# Patient Record
Sex: FEMALE | Employment: PART TIME | URBAN - METROPOLITAN AREA
[De-identification: names, ages, dates, MRNs, and addresses within clinical notes are randomized per-mention and may not be internally consistent; named-entity substitution may affect disease eponyms.]

---

## 2017-10-27 ENCOUNTER — OFFICE VISIT (OUTPATIENT)
Dept: FAMILY MEDICINE | Facility: CLINIC | Age: 37
End: 2017-10-27
Payer: COMMERCIAL

## 2017-10-27 VITALS
HEIGHT: 56 IN | DIASTOLIC BLOOD PRESSURE: 75 MMHG | SYSTOLIC BLOOD PRESSURE: 114 MMHG | BODY MASS INDEX: 16.83 KG/M2 | OXYGEN SATURATION: 97 % | HEART RATE: 97 BPM | WEIGHT: 74.8 LBS | TEMPERATURE: 97.9 F

## 2017-10-27 DIAGNOSIS — K21.9 GASTROESOPHAGEAL REFLUX DISEASE, ESOPHAGITIS PRESENCE NOT SPECIFIED: ICD-10-CM

## 2017-10-27 DIAGNOSIS — Z00.8 ENCOUNTER FOR BIOMETRIC SCREENING: Primary | ICD-10-CM

## 2017-10-27 LAB
CHOLEST SERPL-MCNC: 115 MG/DL
GLUCOSE SERPL-MCNC: 91 MG/DL (ref 70–99)
HDLC SERPL-MCNC: 63 MG/DL
LDLC SERPL CALC-MCNC: 41 MG/DL
NONHDLC SERPL-MCNC: 52 MG/DL
TRIGL SERPL-MCNC: 53 MG/DL

## 2017-10-27 PROCEDURE — 82947 ASSAY GLUCOSE BLOOD QUANT: CPT | Performed by: INTERNAL MEDICINE

## 2017-10-27 PROCEDURE — 36415 COLL VENOUS BLD VENIPUNCTURE: CPT | Performed by: INTERNAL MEDICINE

## 2017-10-27 PROCEDURE — 99213 OFFICE O/P EST LOW 20 MIN: CPT | Performed by: INTERNAL MEDICINE

## 2017-10-27 PROCEDURE — 80061 LIPID PANEL: CPT | Performed by: INTERNAL MEDICINE

## 2017-10-27 NOTE — NURSING NOTE
"Chief Complaint   Patient presents with     Forms     [facility] forms        Initial /75 (BP Location: Right arm, Patient Position: Chair, Cuff Size: Adult Small)  Pulse 97  Temp 97.9  F (36.6  C) (Tympanic)  Ht 4' 7.5\" (1.41 m)  Wt 74 lb 12.8 oz (33.9 kg)  SpO2 97%  BMI 17.07 kg/m2 Estimated body mass index is 17.07 kg/(m^2) as calculated from the following:    Height as of this encounter: 4' 7.5\" (1.41 m).    Weight as of this encounter: 74 lb 12.8 oz (33.9 kg).  Medication Reconciliation: complete   Kylee CUTLER CMA (AAMA)    "

## 2017-10-27 NOTE — PATIENT INSTRUCTIONS
You can use Tums or Rolaids as needed for reflux, a daily medication is probably not necessary at this time.

## 2017-10-27 NOTE — MR AVS SNAPSHOT
"              After Visit Summary   10/27/2017    Rosita Corrigan    MRN: [id number]           Patient Information     Date Of Birth          1980        Visit Information        Provider Department      10/27/2017 4:00 PM Francis Tate MD [facility]        Today's Diagnoses     Encounter for biometric screening    -  1      Care Instructions    You can use Tums or Rolaids as needed for reflux, a daily medication is probably not necessary at this time.            Follow-ups after your visit        Who to contact     If you have questions or need follow up information about today's clinic visit or your schedule please contact [facility] directly at [phone].  Normal or non-critical lab and imaging results will be communicated to you by MyChart, letter or phone within 4 business days after the clinic has received the results. If you do not hear from us within 7 days, please contact the clinic through [employer] or phone. If you have a critical or abnormal lab result, we will notify you by phone as soon as possible.  Submit refill requests through [employer] or call your pharmacy and they will forward the refill request to us. Please allow 3 business days for your refill to be completed.          Additional Information About Your Visit        MyChart Information     [employer] gives you secure access to your electronic health record. If you see a primary care provider, you can also send messages to your care team and make appointments. If you have questions, please call your primary care clinic.  If you do not have a primary care provider, please call [phone] and they will assist you.        Care EveryWhere ID     This is your Care EveryWhere ID. This could be used by other organizations to access your [employer] medical records  [id number]        Your Vitals Were     Pulse Temperature Height Pulse Oximetry BMI (Body Mass Index)       97 97.9  F (36.6  C) (Tympanic) 4' 7.5\" (1.41 m) " 97% 17.07 kg/m2        Blood Pressure from Last 3 Encounters:   10/27/17 114/75   09/13/16 100/66   07/23/15 115/77    Weight from Last 3 Encounters:   10/27/17 74 lb 12.8 oz (33.9 kg)   09/13/16 74 lb (33.6 kg)   07/23/15 69 lb 9.6 oz (31.6 kg)              We Performed the Following     Glucose     Lipid panel reflex to direct LDL Non-fasting        Primary Care Provider Office Phone # Fax #    Ellen Canela [phone]       [facility] [address] [facility] [address]        Equal Access to Services     OLGA GARCIA : Hadii aj kimo Soghislaine, waaxda philadaha, qaybta kaalmada ademc, pascual fried. So [facility] [phone].    ATENCIÓN: Si habla español, tiene a bradford disposición servicios gratuitos de asistencia lingüística. Llame al [phone].    We comply with applicable federal civil rights laws and [place] laws. We do not discriminate on the basis of race, color, national origin, age, disability, sex, sexual orientation, or gender identity.            Thank you!     Thank you for choosing [facility]  for your care. Our goal is always to provide you with excellent care. Hearing back from our patients is one way we can continue to improve our services. Please take a few minutes to complete the written survey that you may receive in the mail after your visit with us. Thank you!             Your Updated Medication List - Protect others around you: Learn how to safely use, store and throw away your medicines at www.disposemymeds.org.          This list is accurate as of: 10/27/17  4:15 PM.  Always use your most recent med list.                   Brand Name Dispense Instructions for use Diagnosis    probiotic Caps     90 capsule    Take 1 tablet by mouth daily

## 2017-10-27 NOTE — PROGRESS NOTES
"  SUBJECTIVE:   Rosita Corrigan is a 37 year old female who presents to clinic today for the following health issues:  Chief Complaint   Patient presents with     Forms     [facility] forms      Rosita presents for Biometric screening.    She also wonders if she should be taking something for reflux.  Gets this 1-2 times per week and currently takes no meds.  Had side effects of dizziness with both PPIs and H2 blockers.      Problem list and histories reviewed & adjusted, as indicated.  Additional history: as documented    Patient Active Problem List   Diagnosis     Irritable bowel syndrome     Anxiety     Past Surgical History:   Procedure Laterality Date     MOUTH SURGERY      wisdom teeth       Social History   Substance Use Topics     Smoking status: Never Smoker     Smokeless tobacco: Never Used     Alcohol use Yes      Comment: occassional     Family History   Problem Relation Age of Onset     Adopted: Yes     Unknown/Adopted Mother          Current Outpatient Prescriptions   Medication Sig Dispense Refill     probiotic CAPS Take 1 tablet by mouth daily 90 capsule 0     Allergies   Allergen Reactions     Omeprazole Other (See Comments) and Difficulty breathing     dizzy         Reviewed and updated as needed this visit by clinical staffTobacco  Allergies  Med Hx  Surg Hx  Fam Hx  Soc Hx      Reviewed and updated as needed this visit by Provider         ROS:  Constitutional and gi systems are negative, except as otherwise noted.      OBJECTIVE:   /75 (BP Location: Right arm, Patient Position: Chair, Cuff Size: Adult Small)  Pulse 97  Temp 97.9  F (36.6  C) (Tympanic)  Ht 4' 7.5\" (1.41 m)  Wt 74 lb 12.8 oz (33.9 kg)  SpO2 97%  BMI 17.07 kg/m2  Body mass index is 17.07 kg/(m^2).  GENERAL: healthy, alert and no distress  RESP: lungs clear to auscultation - no rales, rhonchi or wheezes  CV: regular rate and rhythm, normal S1 S2, no S3 or S4, no murmur, click or rub    ASSESSMENT/PLAN: "       1. Encounter for biometric screening    She would like to draw non-fasting labs now.  Consider repeating fasting if they are elevated.      - Glucose  - Lipid panel reflex to direct LDL Non-fasting    2. Gastroesophageal reflux disease, esophagitis presence not specified    She's had dizziness with both PPIs and H2 blockers.  She wonders if she should be treated this- we discussed long term risk of esophageal dysplasia from long standing reflux, but she is only have very intermittent symptoms, so she is likely not at high risk of this.  Okay to try prn Tums or Rolaids.        Francis Tate MD  [facility]

## 2017-10-30 ENCOUNTER — TELEPHONE (OUTPATIENT)
Dept: FAMILY MEDICINE | Facility: CLINIC | Age: 37
End: 2017-10-30

## 2017-10-30 NOTE — TELEPHONE ENCOUNTER
[facility] screening form completed and signed at appointment and faxed to [phone]

## 2017-11-29 ENCOUNTER — TELEPHONE (OUTPATIENT)
Dept: FAMILY MEDICINE | Facility: CLINIC | Age: 37
End: 2017-11-29

## 2017-11-29 NOTE — TELEPHONE ENCOUNTER
I coded the visit as a 80647, which I think would still be the appropriate charge if it was for biometric screening only.  Maria Esther- is this correct?  Was there an extra charge added elsewhere for the reflux?    My note indicates that she asked about the reflux.  I'm not sure why I would have brought that up on my own since it was not listed on her problem list.      Francis Tate MD

## 2017-11-29 NOTE — TELEPHONE ENCOUNTER
Reason for Call:  Other     Detailed comments: Patient spoke to the billing office about her appointment on 10/27 with Dr. Tate. Patient was charged for Biometric screen and Gastro Reflux Disease. Patient does not think she should be charged for the Gastro reflux disease as she did not come in for that reason. Dr. Tate asked her a question about it that the patient did not want to talk about. The billing office states the only way to get this charge removed is if Dr. Tate changes this description of the visit.    Phone Number Patient can be reached at: Home number on file [phone] (home)    Best Time: any    Can we leave a detailed message on this number? YES    Call taken on 11/29/2017 at 8:30 AM by Nena Mesa

## 2017-11-29 NOTE — TELEPHONE ENCOUNTER
Discussed with Maria Esther and a coding review is in process- they should contact her with further info.  Thanks.

## 2017-12-24 ENCOUNTER — HOSPITAL ENCOUNTER (EMERGENCY)
Facility: CLINIC | Age: 37
Discharge: HOME | End: 2017-12-24
Attending: EMERGENCY MEDICINE | Admitting: EMERGENCY MEDICINE
Payer: COMMERCIAL

## 2017-12-24 VITALS
HEART RATE: 117 BPM | TEMPERATURE: 97.2 F | BODY MASS INDEX: 17.35 KG/M2 | DIASTOLIC BLOOD PRESSURE: 85 MMHG | OXYGEN SATURATION: 100 % | SYSTOLIC BLOOD PRESSURE: 126 MMHG | WEIGHT: 76 LBS | RESPIRATION RATE: 18 BRPM

## 2017-12-24 DIAGNOSIS — J01.00 ACUTE NON-RECURRENT MAXILLARY SINUSITIS: ICD-10-CM

## 2017-12-24 DIAGNOSIS — R05.9 COUGH: ICD-10-CM

## 2017-12-24 DIAGNOSIS — J20.9 ACUTE BRONCHITIS, UNSPECIFIED ORGANISM: ICD-10-CM

## 2017-12-24 PROCEDURE — 99282 EMERGENCY DEPT VISIT SF MDM: CPT | Performed by: EMERGENCY MEDICINE

## 2017-12-24 PROCEDURE — 99284 EMERGENCY DEPT VISIT MOD MDM: CPT | Mod: Z6 | Performed by: EMERGENCY MEDICINE

## 2017-12-24 RX ORDER — AZITHROMYCIN 250 MG/1
TABLET, FILM COATED ORAL
Qty: 6 TABLET | Refills: 0 | Status: SHIPPED | OUTPATIENT
Start: 2017-12-24 | End: 2017-12-29

## 2017-12-24 ASSESSMENT — ENCOUNTER SYMPTOMS
DIZZINESS: 1
SHORTNESS OF BREATH: 0
COUGH: 1
WHEEZING: 0
APPETITE CHANGE: 1

## 2017-12-24 NOTE — ED NOTES
Pt here with cough that she has had for about a week. , s/t and chills, has had a few days, has been around kids that have had pos strep and coughs. She is a;/o x e. Cough is productive greenish thick phlegm. Has been taking in fluids well but decreased food intake.

## 2017-12-24 NOTE — DISCHARGE INSTRUCTIONS
Antibiotics as prescribed.          Bronchitis, Antibiotic Treatment (Adult)    Bronchitis is an infection of the air passages (bronchial tubes) in your lungs. It often occurs when you have a cold. This illness is contagious during the first few days and is spread through the air by coughing and sneezing, or by direct contact (touching the sick person and then touching your own eyes, nose, or mouth).  Symptoms of bronchitis include cough with mucus (phlegm) and low-grade fever. Bronchitis usually lasts 7 to 14 days. Mild cases can be treated with simple home remedies. More severe infection is treated with an antibiotic.  Home care  Follow these guidelines when caring for yourself at home:    If your symptoms are severe, rest at home for the first 2 to 3 days. When you go back to your usual activities, don't let yourself get too tired.    Do not smoke. Also avoid being exposed to secondhand smoke.    You may use over-the-counter medicines to control fever or pain, unless another medicine was prescribed. (Note: If you have chronic liver or kidney disease or have ever had a stomach ulcer or gastrointestinal bleeding, talk with your healthcare provider before using these medicines. Also talk to your provider if you are taking medicine to prevent blood clots.) Aspirin should never be given to anyone younger than 18 years of age who is ill with a viral infection or fever. It may cause severe liver or brain damage.    Your appetite may be poor, so a light diet is fine. Avoid dehydration by drinking 6 to 8 glasses of fluids per day (such as water, soft drinks, sports drinks, juices, tea, or soup). Extra fluids will help loosen secretions in the nose and lungs.    Over-the-counter cough, cold, and sore-throat medicines will not shorten the length of the illness, but they may be helpful to reduce symptoms. (Note: Do not use decongestants if you have high blood pressure.)    Finish all antibiotic medicine. Do this even if you  are feeling better after only a few days.  Follow-up care  Follow up with your healthcare provider, or as advised. If you had an X-ray or ECG (electrocardiogram), a specialist will review it. You will be notified of any new findings that may affect your care.  Note: If you are age 65 or older, or if you have a chronic lung disease or condition that affects your immune system, or you smoke, talk to your healthcare provider about having pneumococcal vaccinations and a yearly influenza vaccination (flu shot).  When to seek medical advice  Call your healthcare provider right away if any of these occur:    Fever of 100.4 F (38 C) or higher    Coughing up increased amounts of colored sputum    Weakness, drowsiness, headache, facial pain, ear pain, or a stiff neck  Call 911, or get immediate medical care  Contact emergency services right away if any of these occur.    Coughing up blood    Worsening weakness, drowsiness, headache, or stiff neck    Trouble breathing, wheezing, or pain with breathing  Date Last Reviewed: 9/13/2015 2000-2017 [employer]. [address] [place] [address]. All rights reserved. This information is not intended as a substitute for professional medical care. Always follow your healthcare professional's instructions.

## 2017-12-24 NOTE — ED AVS SNAPSHOT
[facility] Emergency Department    [address] [address]    Phone:  [phone]    Fax:  [phone]                                       Rosita Corrigan   MRN: [id number]    Department:  [facility] Emergency Department   Date of Visit:  12/24/2017           Patient Information     Date Of Birth          1980        Your diagnoses for this visit were:     Cough     Acute bronchitis, unspecified organism     Acute non-recurrent maxillary sinusitis        You were seen by Alphonso Harrington MD.        Discharge Instructions       Antibiotics as prescribed.          Bronchitis, Antibiotic Treatment (Adult)    Bronchitis is an infection of the air passages (bronchial tubes) in your lungs. It often occurs when you have a cold. This illness is contagious during the first few days and is spread through the air by coughing and sneezing, or by direct contact (touching the sick person and then touching your own eyes, nose, or mouth).  Symptoms of bronchitis include cough with mucus (phlegm) and low-grade fever. Bronchitis usually lasts 7 to 14 days. Mild cases can be treated with simple home remedies. More severe infection is treated with an antibiotic.  Home care  Follow these guidelines when caring for yourself at home:    If your symptoms are severe, rest at home for the first 2 to 3 days. When you go back to your usual activities, don't let yourself get too tired.    Do not smoke. Also avoid being exposed to secondhand smoke.    You may use over-the-counter medicines to control fever or pain, unless another medicine was prescribed. (Note: If you have chronic liver or kidney disease or have ever had a stomach ulcer or gastrointestinal bleeding, talk with your healthcare provider before using these medicines. Also talk to your provider if you are taking medicine to prevent blood clots.) Aspirin should never be given to anyone younger than 18 years of age who is ill with a viral  infection or fever. It may cause severe liver or brain damage.    Your appetite may be poor, so a light diet is fine. Avoid dehydration by drinking 6 to 8 glasses of fluids per day (such as water, soft drinks, sports drinks, juices, tea, or soup). Extra fluids will help loosen secretions in the nose and lungs.    Over-the-counter cough, cold, and sore-throat medicines will not shorten the length of the illness, but they may be helpful to reduce symptoms. (Note: Do not use decongestants if you have high blood pressure.)    Finish all antibiotic medicine. Do this even if you are feeling better after only a few days.  Follow-up care  Follow up with your healthcare provider, or as advised. If you had an X-ray or ECG (electrocardiogram), a specialist will review it. You will be notified of any new findings that may affect your care.  Note: If you are age 65 or older, or if you have a chronic lung disease or condition that affects your immune system, or you smoke, talk to your healthcare provider about having pneumococcal vaccinations and a yearly influenza vaccination (flu shot).  When to seek medical advice  Call your healthcare provider right away if any of these occur:    Fever of 100.4 F (38 C) or higher    Coughing up increased amounts of colored sputum    Weakness, drowsiness, headache, facial pain, ear pain, or a stiff neck  Call 911, or get immediate medical care  Contact emergency services right away if any of these occur.    Coughing up blood    Worsening weakness, drowsiness, headache, or stiff neck    Trouble breathing, wheezing, or pain with breathing  Date Last Reviewed: 9/13/2015 2000-2017 [employer]. [address] [place] [address], [place] [address]. All rights reserved. This information is not intended as a substitute for professional medical care. Always follow your healthcare professional's instructions.          24 Hour Appointment Hotline       To make an appointment at any [facility],  call [phone] ([phone]). If you don't have a family doctor or clinic, we will help you find one. [facility] clinics are conveniently located to serve the needs of you and your family.             Review of your medicines      START taking        Dose / Directions Last dose taken    azithromycin 250 MG tablet   Commonly known as:  ZITHROMAX Z-RUBEN   Quantity:  6 tablet        Two tablets on the first day, then one tablet daily for the next 4 days   Refills:  0          Our records show that you are taking the medicines listed below. If these are incorrect, please call your family doctor or clinic.        Dose / Directions Last dose taken    probiotic Caps   Dose:  1 tablet   Quantity:  90 capsule        Take 1 tablet by mouth daily   Refills:  0                Prescriptions were sent or printed at these locations (1 Prescription)                   [facility] [address] [address]    Telephone:  [phone]   Fax:  [phone]   Hours:                  E-Prescribed (1 of 1)         azithromycin (ZITHROMAX Z-RUBEN) 250 MG tablet                Orders Needing Specimen Collection     None      Pending Results     No orders found from 12/22/2017 to 12/25/2017.            Pending Culture Results     No orders found from 12/22/2017 to 12/25/2017.            Pending Results Instructions     If you had any lab results that were not finalized at the time of your Discharge, you can call the ED Lab Result RN at [phone]. You will be contacted by this team for any positive Lab results or changes in treatment. The nurses are available 7 days a week from 10A to 6:30P.  You can leave a message 24 hours per day and they will return your call.        Test Results From Your Hospital Stay               Thank you for choosing [facility]       Thank you for choosing [facility] for your care. Our goal is always to provide you with excellent care. Hearing back from  our patients is one way we can continue to improve our services. Please take a few minutes to complete the written survey that you may receive in the mail after you visit with us. Thank you!        [employer] Information     [employer] gives you secure access to your electronic health record. If you see a primary care provider, you can also send messages to your care team and make appointments. If you have questions, please call your primary care clinic.  If you do not have a primary care provider, please call [phone] and they will assist you.        Care EveryWhere ID     This is your Care EveryWhere ID. This could be used by other organizations to access your [facility] medical records  [id number]        Equal Access to Services     OLGA GARCIA : Lynette Gillis, rebekah gustafson, herrera daniels, pascual fried. So [facility] [phone].    ATENCIÓN: Si habla español, tiene a bradford disposición servicios gratuitos de asistencia lingüística. Llame al [phone].    We comply with applicable federal civil rights laws and [place] laws. We do not discriminate on the basis of race, color, national origin, age, disability, sex, sexual orientation, or gender identity.            After Visit Summary       This is your record. Keep this with you and show to your community pharmacist(s) and doctor(s) at your next visit.

## 2017-12-24 NOTE — ED AVS SNAPSHOT
[facility] Emergency Department    [address] [facility] [address]    Phone:  [phone]    Fax:  [phone]                                       Rosita Corrigan   MRN: [id number]    Department:  [facility] Emergency Department   Date of Visit:  12/24/2017           After Visit Summary Signature Page     I have received my discharge instructions, and my questions have been answered. I have discussed any challenges I see with this plan with the nurse or doctor.    ..........................................................................................................................................  Patient/Patient Representative Signature      ..........................................................................................................................................  Patient Representative Print Name and Relationship to Patient    ..................................................               ................................................  Date                                            Time    ..........................................................................................................................................  Reviewed by Signature/Title    ...................................................              ..............................................  Date                                                            Time

## 2017-12-24 NOTE — ED PROVIDER NOTES
History     Chief Complaint   Patient presents with     Cough     HPI  Rosita Corrigan is a 37 year old female who presents to the ED with her  for the evaluation of a cough x1 week. She reports a productive cough, with green, brown, and red sputum. Patient states her symptoms began with a headache, and then she developed a cough. She suspected a sinus infection and took Sudafed all day yesterday, which helped relieve her headache but did not alleviate her cough. Patient presents today with a concern for pneumonia. She complains of dizziness, but attributes this to her not eating very much lately. Patient does not know if she has a fever. 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 Patient has never used an inhaler before. Denies SOB, wheezing, lower extremity edema, any recent travel.    Problem List:    Patient Active Problem List    Diagnosis Date Noted     Anxiety 2015     Priority: Medium     Irritable bowel syndrome 2015     Priority: Medium        Past Medical History:    Past Medical History:   Diagnosis Date     Gestational diabetes       contractions        Past Surgical History:    Past Surgical History:   Procedure Laterality Date     MOUTH SURGERY      wisdom teeth       Family History:    Family History   Problem Relation Age of Onset     Adopted: Yes     Unknown/Adopted Mother        Social History:  Marital Status:   [2]  Social History   Substance Use Topics     Smoking status: Never Smoker     Smokeless tobacco: Never Used     Alcohol use Yes      Comment: occassional        Medications:      azithromycin (ZITHROMAX Z-RUBEN) 250 MG tablet   probiotic CAPS         Review of Systems   Constitutional: Positive for appetite change (loss).   Respiratory: Positive for cough (productive). Negative for shortness of breath and wheezing.    Cardiovascular: Negative for leg swelling.   Neurological: Positive for dizziness.   All other systems reviewed and  are negative.      Physical Exam   BP: 126/85  Pulse: 117  Temp: 97.2  F (36.2  C)  Resp: 18  Weight: 34.5 kg (76 lb)  SpO2: 100 %      Physical Exam  /85  Pulse 117  Temp 97.2  F (36.2  C)  Resp 18  Wt 34.5 kg (76 lb)  SpO2 100%  BMI 17.35 kg/m2  /85  Pulse 117  Temp 97.2  F (36.2  C)  Resp 18  Wt 34.5 kg (76 lb)  SpO2 100%  BMI 17.35 kg/m2  General: alert and in no acute distress  Head: atraumatic, normocephalic    Abd: Soft, nontender, nondistended, no peritoneal signs ears are normal bilaterally  Lungs: Clear to auscultation bilaterally, with no wheezes, rhonchi, or rales  Musculoskel/Extremities: normal extremities, no edema, erythema, tenderness and full AROM of major joints without tenderness  Skin: no rashes, no diaphoresis and skin color normal  Neuro: Patient awake, alert, oriented, speech is fluent, gait is normal  Psychiatric: affect/mood normal, cooperative, normal judgement/insight and memory intact          ED Course     ED Course     Procedures               Critical Care time:  none               Labs Ordered and Resulted from Time of ED Arrival Up to the Time of Departure from the ED - No data to display  No results found for this or any previous visit (from the past 24 hour(s)).    Medications - No data to display    12:05 PM Patient assessed.      Assessments & Plan (with Medical Decision Making)  37 year old female, presenting to the emergency department with one-week history of productive cough, with greenish sputum.  No fevers, however has had slight chills.  Does have multiple ill contacts.  No daily medication use.  Patient arrives afebrile, with normal vitals.  Symptoms have started out with sinus type infection symptoms, with maxillary pain, and since developed into productive cough.  Given history of bronchitis with similar types of symptoms, and patient with worsening productive cough, will treat with Z-Sawyer for likely community-acquired pneumonia.  Follow-up as  needed with primary care provider, and otherwise return if severe worsening of symptoms.       I have reviewed the nursing notes.    I have reviewed the findings, diagnosis, plan and need for follow up with the patient.       Discharge Medication List as of 12/24/2017 12:02 PM      START taking these medications    Details   azithromycin (ZITHROMAX Z-RUBEN) 250 MG tablet Two tablets on the first day, then one tablet daily for the next 4 days, Disp-6 tablet, R-0, E-Prescribe             Final diagnoses:   Cough   Acute bronchitis, unspecified organism   Acute non-recurrent maxillary sinusitis     This document serves as a record of the services and decisions personally performed and made by Alphonso Harrington MD. It was created on HIS/HER behalf by   Osbaldo Mcleod, a trained medical scribe. The creation of this document is based the provider's statements to the medical scribe.  Osbaldo Mcleod 12:05 PM 12/24/2017    Provider:   The information in this document, created by the medical scribe for me, accurately reflects the services I personally performed and the decisions made by me. I have reviewed and approved this document for accuracy prior to leaving the patient care area.  Alphonso Harrington MD 12:05 PM 12/24/2017 12/24/2017   [facility] EMERGENCY DEPARTMENT     Alphonso Harrington MD  12/24/17 [id number]

## 2018-11-09 ENCOUNTER — OFFICE VISIT (OUTPATIENT)
Dept: FAMILY MEDICINE | Facility: CLINIC | Age: 38
End: 2018-11-09
Payer: COMMERCIAL

## 2018-11-09 VITALS
SYSTOLIC BLOOD PRESSURE: 102 MMHG | HEART RATE: 84 BPM | TEMPERATURE: 98.5 F | BODY MASS INDEX: 17.32 KG/M2 | DIASTOLIC BLOOD PRESSURE: 68 MMHG | WEIGHT: 77 LBS | HEIGHT: 56 IN

## 2018-11-09 DIAGNOSIS — Z00.00 ENCOUNTER FOR ROUTINE ADULT HEALTH EXAMINATION WITHOUT ABNORMAL FINDINGS: Primary | ICD-10-CM

## 2018-11-09 DIAGNOSIS — Z13.6 CARDIOVASCULAR SCREENING; LDL GOAL LESS THAN 160: ICD-10-CM

## 2018-11-09 DIAGNOSIS — Z86.32 H/O GESTATIONAL DIABETES MELLITUS, NOT CURRENTLY PREGNANT: ICD-10-CM

## 2018-11-09 DIAGNOSIS — Z13.1 SCREENING FOR DIABETES MELLITUS: ICD-10-CM

## 2018-11-09 LAB
CHOLEST SERPL-MCNC: 146 MG/DL
GLUCOSE SERPL-MCNC: 84 MG/DL (ref 70–99)
HBA1C MFR BLD: 5.4 % (ref 0–5.6)
HDLC SERPL-MCNC: 62 MG/DL
LDLC SERPL CALC-MCNC: 71 MG/DL
NONHDLC SERPL-MCNC: 84 MG/DL
TRIGL SERPL-MCNC: 63 MG/DL

## 2018-11-09 PROCEDURE — 80061 LIPID PANEL: CPT | Performed by: FAMILY MEDICINE

## 2018-11-09 PROCEDURE — 99395 PREV VISIT EST AGE 18-39: CPT | Performed by: FAMILY MEDICINE

## 2018-11-09 PROCEDURE — 83036 HEMOGLOBIN GLYCOSYLATED A1C: CPT | Performed by: FAMILY MEDICINE

## 2018-11-09 PROCEDURE — 36415 COLL VENOUS BLD VENIPUNCTURE: CPT | Performed by: FAMILY MEDICINE

## 2018-11-09 PROCEDURE — 82947 ASSAY GLUCOSE BLOOD QUANT: CPT | Performed by: FAMILY MEDICINE

## 2018-11-09 NOTE — PROGRESS NOTES
SUBJECTIVE:   CC: Rosita Corrigan is an 38 year old woman who presents for preventive health visit.     Healthy Habits:    Do you get at least three servings of calcium containing foods daily (dairy, green leafy vegetables, etc.)? yes    Amount of exercise or daily activities, outside of work: 3 day(s) per week - walking    Problems taking medications regularly No    Medication side effects: No    Have you had an eye exam in the past two years? yes    Do you see a dentist twice per year? yes    Do you have sleep apnea, excessive snoring or daytime drowsiness?no      No concerns    Today's PHQ-2 Score:   PHQ-2 (  [employer]) 2018   Q1: Little interest or pleasure in doing things 0 0   Q2: Feeling down, depressed or hopeless 0 0   PHQ-2 Score 0 0       Abuse: Current or Past(Physical, Sexual or Emotional)- No  Do you feel safe in your environment - Yes    Social History   Substance Use Topics     Smoking status: Never Smoker     Smokeless tobacco: Never Used     Alcohol use Yes      Comment: occassional     If you drink alcohol do you typically have >3 drinks per day or >7 drinks per week? No                     Reviewed orders with patient.  Reviewed health maintenance and updated orders accordingly - Yes    Mammo discussed, not appropriate for or declined by this patient.    Pertinent mammograms are reviewed under the imaging tab.  History of abnormal Pap smear: NO - age 30- 65 PAP every 3 years recommended  PAP / HPV Latest Ref Rng & Units 2015   PAP - NIL   HPV 16 DNA NEG Negative   HPV 18 DNA NEG Negative   OTHER HR HPV NEG Negative     Reviewed and updated as needed this visit by clinical staff  Tobacco  Allergies  Meds  Problems  Med Hx  Surg Hx  Fam Hx  Soc Hx          Reviewed and updated as needed this visit by Provider  Allergies  Meds  Problems        Past Medical History:   Diagnosis Date     Gestational diabetes       contractions       Past Surgical History:  "  Procedure Laterality Date     MOUTH SURGERY      wisdom teeth        ROS:  CONSTITUTIONAL: NEGATIVE for fever, chills, change in weight  INTEGUMENTARU/SKIN: NEGATIVE for worrisome rashes, moles or lesions  EYES: NEGATIVE for vision changes or irritation  ENT: NEGATIVE for ear, mouth and throat problems  RESP: NEGATIVE for significant cough or SOB  BREAST: NEGATIVE for masses, tenderness or discharge  CV: NEGATIVE for chest pain, palpitations or peripheral edema  GI: NEGATIVE for nausea, abdominal pain, heartburn, or change in bowel habits  : NEGATIVE for unusual urinary or vaginal symptoms. Periods are regular.  MUSCULOSKELETAL: NEGATIVE for significant arthralgias or myalgia  NEURO: NEGATIVE for weakness, dizziness or paresthesias  PSYCHIATRIC: NEGATIVE for changes in mood or affect    OBJECTIVE:   /68  Pulse 84  Temp 98.5  F (36.9  C) (Tympanic)  Ht 4' 7.8\" (1.417 m)  Wt 77 lb (34.9 kg)  LMP 11/05/2018  Breastfeeding? No  BMI 17.39 kg/m2  EXAM:  GENERAL: healthy, alert and no distress  EYES: Eyes grossly normal to inspection, PERRL and conjunctivae and sclerae normal  HENT: ear canals and TM's normal, nose and mouth without ulcers or lesions  NECK: no adenopathy, no asymmetry, masses, or scars and thyroid normal to palpation  RESP: lungs clear to auscultation - no rales, rhonchi or wheezes  BREAST: normal without masses, tenderness or nipple discharge and no palpable axillary masses or adenopathy  CV: regular rate and rhythm, normal S1 S2, no S3 or S4, no murmur, click or rub, no peripheral edema and peripheral pulses strong  ABDOMEN: soft, nontender, no hepatosplenomegaly, no masses and bowel sounds normal  MS: no gross musculoskeletal defects noted, no edema  NEURO: Normal strength and tone, mentation intact and speech normal  PSYCH: mentation appears normal, affect normal/bright    Diagnostic Test Results:  none     ASSESSMENT/PLAN:       ICD-10-CM    1. Encounter for routine adult health " "examination without abnormal findings Z00.00    2. CARDIOVASCULAR SCREENING; LDL GOAL LESS THAN 160 Z13.6 Lipid panel reflex to direct LDL Fasting   3. Screening for diabetes mellitus Z13.1 Glucose     Hemoglobin A1c   4. H/O gestational diabetes mellitus, not currently pregnant Z86.32        COUNSELING:   Reviewed preventive health counseling, as reflected in patient instructions  Special attention given to:        Regular exercise       Healthy diet/nutrition       Osteoporosis Prevention/Bone Health    BP Readings from Last 1 Encounters:   11/09/18 102/68     Estimated body mass index is 17.39 kg/(m^2) as calculated from the following:    Height as of this encounter: 4' 7.8\" (1.417 m).    Weight as of this encounter: 77 lb (34.9 kg).           reports that she has never smoked. She has never used smokeless tobacco.      Counseling Resources:  ATP IV Guidelines  Pooled Cohorts Equation Calculator  Breast Cancer Risk Calculator  FRAX Risk Assessment  ICSI Preventive Guidelines  Dietary Guidelines for Americans, 2010  USDA's MyPlate  ASA Prophylaxis  Lung CA Screening    Kareen Angeles, DO  [facility]  "

## 2018-11-09 NOTE — MR AVS SNAPSHOT
After Visit Summary   11/9/2018    Rosita Corrigan    MRN: [id number]           Patient Information     Date Of Birth          1980        Visit Information        Provider Department      11/9/2018 11:00 AM Kareen Angeles DO [facility]        Today's Diagnoses     Encounter for routine adult health examination without abnormal findings    -  1    CARDIOVASCULAR SCREENING; LDL GOAL LESS THAN 160        Screening for diabetes mellitus          Care Instructions      Preventive Health Recommendations  Female Ages 26 - 39  Yearly exam:   See your health care provider every year in order to    Review health changes.     Discuss preventive care.      Review your medicines if you your doctor has prescribed any.    Until age 30: Get a Pap test every three years (more often if you have had an abnormal result).    After age 30: Talk to your doctor about whether you should have a Pap test every 3 years or have a Pap test with HPV screening every 5 years.   You do not need a Pap test if your uterus was removed (hysterectomy) and you have not had cancer.  You should be tested each year for STDs (sexually transmitted diseases), if you're at risk.   Talk to your provider about how often to have your cholesterol checked.  If you are at risk for diabetes, you should have a diabetes test (fasting glucose).  Shots: Get a flu shot each year. Get a tetanus shot every 10 years.   Nutrition:     Eat at least 5 servings of fruits and vegetables each day.    Eat whole-grain bread, whole-wheat pasta and brown rice instead of white grains and rice.    Get adequate Calcium and Vitamin D.     Lifestyle    Exercise at least 150 minutes a week (30 minutes a day, 5 days of the week). This will help you control your weight and prevent disease.    Limit alcohol to one drink per day.    No smoking.     Wear sunscreen to prevent skin cancer.    See your dentist every six months for an exam and cleaning.          "   Follow-ups after your visit        Follow-up notes from your care team     Return in about 1 year (around 11/9/2019) for Physical Exam.      Who to contact     Normal or non-critical lab and imaging results will be communicated to you by XRONethart, letter or phone within 4 business days after the clinic has received the results. If you do not hear from us within 7 days, please contact the clinic through XRONethart or phone. If you have a critical or abnormal lab result, we will notify you by phone as soon as possible.  Submit refill requests through Gyst or call your pharmacy and they will forward the refill request to us. Please allow 3 business days for your refill to be completed.          If you need to speak with a  for additional information , please call: [phone]           Additional Information About Your Visit        Gyst Information     Gyst gives you secure access to your electronic health record. If you see a primary care provider, you can also send messages to your care team and make appointments. If you have questions, please call your primary care clinic.  If you do not have a primary care provider, please call [phone] and they will assist you.        Care EveryWhere ID     This is your Care EveryWhere ID. This could be used by other organizations to access your [facility] medical records  [id number]        Your Vitals Were     Pulse Temperature Height Last Period Breastfeeding? BMI (Body Mass Index)    84 98.5  F (36.9  C) (Tympanic) 4' 7.8\" (1.417 m) 11/05/2018 No 17.39 kg/m2       Blood Pressure from Last 3 Encounters:   11/09/18 102/68   12/24/17 126/85   10/27/17 114/75    Weight from Last 3 Encounters:   11/09/18 77 lb (34.9 kg)   12/24/17 76 lb (34.5 kg)   10/27/17 74 lb 12.8 oz (33.9 kg)              We Performed the Following     Glucose     Hemoglobin A1c     Lipid panel reflex to direct LDL Fasting          Today's Medication Changes          These changes " are accurate as of 11/9/18 11:40 AM.  If you have any questions, ask your nurse or doctor.               Stop taking these medicines if you haven't already. Please contact your care team if you have questions.     probiotic Caps   Stopped by:  Kareen Angeles,                     Primary Care Provider Office Phone # Fax #    Ellen Canela [phone]       [facility] [id number] [place] [id number]        Equal Access to Services     UGO GARCIA : Hadii aad ku hadasho Soomaali, waaxda luqadaha, qaybta kaalmada adeegyada, waxay idiin hayaan adeeg kharash laerica . So [facility] [phone].    ATENCIÓN: Si habla español, tiene a bradford disposición servicios gratuitos de asistencia lingüística. [facility] [phone].    We comply with applicable federal civil rights laws and [place] laws. We do not discriminate on the basis of race, color, national origin, age, disability, sex, sexual orientation, or gender identity.            Thank you!     Thank you for choosing [facility]  for your care. Our goal is always to provide you with excellent care. Hearing back from our patients is one way we can continue to improve our services. Please take a few minutes to complete the written survey that you may receive in the mail after your visit with us. Thank you!             Your Updated Medication List - Protect others around you: Learn how to safely use, store and throw away your medicines at www.disposemymeds.org.      Notice  As of 11/9/2018 11:40 AM    You have not been prescribed any medications.

## 2018-11-09 NOTE — NURSING NOTE
"Initial /68  Pulse 84  Temp 98.5  F (36.9  C) (Tympanic)  Ht 4' 7.8\" (1.417 m)  Wt 77 lb (34.9 kg)  LMP 11/05/2018  Breastfeeding? No  BMI 17.39 kg/m2 Estimated body mass index is 17.39 kg/(m^2) as calculated from the following:    Height as of this encounter: 4' 7.8\" (1.417 m).    Weight as of this encounter: 77 lb (34.9 kg). .      "

## 2020-03-10 ENCOUNTER — HEALTH MAINTENANCE LETTER (OUTPATIENT)
Age: 40
End: 2020-03-10

## 2020-12-20 ENCOUNTER — HEALTH MAINTENANCE LETTER (OUTPATIENT)
Age: 40
End: 2020-12-20

## 2021-04-24 ENCOUNTER — HEALTH MAINTENANCE LETTER (OUTPATIENT)
Age: 41
End: 2021-04-24

## 2021-05-05 ENCOUNTER — APPOINTMENT (OUTPATIENT)
Dept: ULTRASOUND IMAGING | Facility: CLINIC | Age: 41
End: 2021-05-05
Attending: PHYSICIAN ASSISTANT
Payer: COMMERCIAL

## 2021-05-05 ENCOUNTER — HOSPITAL ENCOUNTER (EMERGENCY)
Facility: CLINIC | Age: 41
Discharge: HOME | End: 2021-05-05
Attending: PHYSICIAN ASSISTANT | Admitting: PHYSICIAN ASSISTANT
Payer: COMMERCIAL

## 2021-05-05 ENCOUNTER — APPOINTMENT (OUTPATIENT)
Dept: CT IMAGING | Facility: CLINIC | Age: 41
End: 2021-05-05
Attending: PHYSICIAN ASSISTANT
Payer: COMMERCIAL

## 2021-05-05 VITALS
RESPIRATION RATE: 16 BRPM | DIASTOLIC BLOOD PRESSURE: 79 MMHG | SYSTOLIC BLOOD PRESSURE: 109 MMHG | WEIGHT: 80 LBS | BODY MASS INDEX: 18.06 KG/M2 | TEMPERATURE: 97.5 F | OXYGEN SATURATION: 99 % | HEART RATE: 87 BPM

## 2021-05-05 DIAGNOSIS — N83.209 OVARIAN CYST: ICD-10-CM

## 2021-05-05 DIAGNOSIS — R10.84 ABDOMINAL PAIN, GENERALIZED: ICD-10-CM

## 2021-05-05 LAB
ALBUMIN SERPL-MCNC: 3.8 G/DL (ref 3.4–5)
ALBUMIN UR-MCNC: NEGATIVE MG/DL
ALP SERPL-CCNC: 78 U/L (ref 40–150)
ALT SERPL W P-5'-P-CCNC: 16 U/L (ref 0–50)
ANION GAP SERPL CALCULATED.3IONS-SCNC: 8 MMOL/L (ref 3–14)
APPEARANCE UR: CLEAR
AST SERPL W P-5'-P-CCNC: 19 U/L (ref 0–45)
BACTERIA #/AREA URNS HPF: ABNORMAL /HPF
BASOPHILS # BLD AUTO: 0 10E9/L (ref 0–0.2)
BASOPHILS NFR BLD AUTO: 0.2 %
BILIRUB SERPL-MCNC: 0.7 MG/DL (ref 0.2–1.3)
BILIRUB UR QL STRIP: NEGATIVE
BUN SERPL-MCNC: 11 MG/DL (ref 7–30)
CALCIUM SERPL-MCNC: 8.7 MG/DL (ref 8.5–10.1)
CHLORIDE SERPL-SCNC: 103 MMOL/L (ref 94–109)
CO2 SERPL-SCNC: 26 MMOL/L (ref 20–32)
COLOR UR AUTO: YELLOW
CREAT SERPL-MCNC: 0.6 MG/DL (ref 0.52–1.04)
DIFFERENTIAL METHOD BLD: NORMAL
EOSINOPHIL # BLD AUTO: 0 10E9/L (ref 0–0.7)
EOSINOPHIL NFR BLD AUTO: 0.2 %
ERYTHROCYTE [DISTWIDTH] IN BLOOD BY AUTOMATED COUNT: 13.2 % (ref 10–15)
GFR SERPL CREATININE-BSD FRML MDRD: >90 ML/MIN/{1.73_M2}
GLUCOSE SERPL-MCNC: 109 MG/DL (ref 70–99)
GLUCOSE UR STRIP-MCNC: NEGATIVE MG/DL
HCG SERPL QL: NEGATIVE
HCT VFR BLD AUTO: 37.2 % (ref 35–47)
HGB BLD-MCNC: 12.2 G/DL (ref 11.7–15.7)
HGB UR QL STRIP: NEGATIVE
IMM GRANULOCYTES # BLD: 0 10E9/L (ref 0–0.4)
IMM GRANULOCYTES NFR BLD: 0.4 %
KETONES UR STRIP-MCNC: NEGATIVE MG/DL
LACTATE BLD-SCNC: 1.1 MMOL/L (ref 0.7–2)
LEUKOCYTE ESTERASE UR QL STRIP: NEGATIVE
LIPASE SERPL-CCNC: 50 U/L (ref 73–393)
LYMPHOCYTES # BLD AUTO: 1.2 10E9/L (ref 0.8–5.3)
LYMPHOCYTES NFR BLD AUTO: 12.5 %
MCH RBC QN AUTO: 27.7 PG (ref 26.5–33)
MCHC RBC AUTO-ENTMCNC: 32.8 G/DL (ref 31.5–36.5)
MCV RBC AUTO: 84 FL (ref 78–100)
MONOCYTES # BLD AUTO: 0.5 10E9/L (ref 0–1.3)
MONOCYTES NFR BLD AUTO: 5.3 %
MUCOUS THREADS #/AREA URNS LPF: PRESENT /LPF
NEUTROPHILS # BLD AUTO: 8 10E9/L (ref 1.6–8.3)
NEUTROPHILS NFR BLD AUTO: 81.4 %
NITRATE UR QL: NEGATIVE
NRBC # BLD AUTO: 0 10*3/UL
NRBC BLD AUTO-RTO: 0 /100
PH UR STRIP: 6 PH (ref 5–7)
PLATELET # BLD AUTO: 290 10E9/L (ref 150–450)
POTASSIUM SERPL-SCNC: 4 MMOL/L (ref 3.4–5.3)
PROT SERPL-MCNC: 8 G/DL (ref 6.8–8.8)
RBC # BLD AUTO: 4.41 10E12/L (ref 3.8–5.2)
RBC #/AREA URNS AUTO: 0 /HPF (ref 0–2)
SODIUM SERPL-SCNC: 137 MMOL/L (ref 133–144)
SOURCE: ABNORMAL
SP GR UR STRIP: 1.01 (ref 1–1.03)
SQUAMOUS #/AREA URNS AUTO: 1 /HPF (ref 0–1)
UROBILINOGEN UR STRIP-MCNC: 0 MG/DL (ref 0–2)
WBC # BLD AUTO: 9.8 10E9/L (ref 4–11)
WBC #/AREA URNS AUTO: 1 /HPF (ref 0–5)

## 2021-05-05 PROCEDURE — 96374 THER/PROPH/DIAG INJ IV PUSH: CPT | Mod: 59 | Performed by: PHYSICIAN ASSISTANT

## 2021-05-05 PROCEDURE — 250N000009 HC RX 250: Performed by: PHYSICIAN ASSISTANT

## 2021-05-05 PROCEDURE — 83605 ASSAY OF LACTIC ACID: CPT | Performed by: PHYSICIAN ASSISTANT

## 2021-05-05 PROCEDURE — 258N000003 HC RX IP 258 OP 636: Performed by: PHYSICIAN ASSISTANT

## 2021-05-05 PROCEDURE — 99285 EMERGENCY DEPT VISIT HI MDM: CPT | Mod: 25 | Performed by: PHYSICIAN ASSISTANT

## 2021-05-05 PROCEDURE — 76856 US EXAM PELVIC COMPLETE: CPT

## 2021-05-05 PROCEDURE — 96372 THER/PROPH/DIAG INJ SC/IM: CPT | Performed by: PHYSICIAN ASSISTANT

## 2021-05-05 PROCEDURE — 250N000011 HC RX IP 250 OP 636: Performed by: PHYSICIAN ASSISTANT

## 2021-05-05 PROCEDURE — 96375 TX/PRO/DX INJ NEW DRUG ADDON: CPT | Performed by: PHYSICIAN ASSISTANT

## 2021-05-05 PROCEDURE — 81001 URINALYSIS AUTO W/SCOPE: CPT | Performed by: PHYSICIAN ASSISTANT

## 2021-05-05 PROCEDURE — 99285 EMERGENCY DEPT VISIT HI MDM: CPT | Performed by: PHYSICIAN ASSISTANT

## 2021-05-05 PROCEDURE — 96361 HYDRATE IV INFUSION ADD-ON: CPT | Performed by: PHYSICIAN ASSISTANT

## 2021-05-05 PROCEDURE — 83690 ASSAY OF LIPASE: CPT | Performed by: PHYSICIAN ASSISTANT

## 2021-05-05 PROCEDURE — 85025 COMPLETE CBC W/AUTO DIFF WBC: CPT | Performed by: PHYSICIAN ASSISTANT

## 2021-05-05 PROCEDURE — 76830 TRANSVAGINAL US NON-OB: CPT

## 2021-05-05 PROCEDURE — 84703 CHORIONIC GONADOTROPIN ASSAY: CPT | Performed by: PHYSICIAN ASSISTANT

## 2021-05-05 PROCEDURE — 80053 COMPREHEN METABOLIC PANEL: CPT | Performed by: PHYSICIAN ASSISTANT

## 2021-05-05 PROCEDURE — 74177 CT ABD & PELVIS W/CONTRAST: CPT

## 2021-05-05 RX ORDER — KETOROLAC TROMETHAMINE 30 MG/ML
30 INJECTION, SOLUTION INTRAMUSCULAR; INTRAVENOUS ONCE
Status: COMPLETED | OUTPATIENT
Start: 2021-05-05 | End: 2021-05-05

## 2021-05-05 RX ORDER — IOPAMIDOL 755 MG/ML
39 INJECTION, SOLUTION INTRAVASCULAR ONCE
Status: COMPLETED | OUTPATIENT
Start: 2021-05-05 | End: 2021-05-05

## 2021-05-05 RX ORDER — ONDANSETRON 2 MG/ML
4 INJECTION INTRAMUSCULAR; INTRAVENOUS EVERY 30 MIN PRN
Status: DISCONTINUED | OUTPATIENT
Start: 2021-05-05 | End: 2021-05-05 | Stop reason: HOSPADM

## 2021-05-05 RX ORDER — SODIUM CHLORIDE 9 MG/ML
INJECTION, SOLUTION INTRAVENOUS CONTINUOUS
Status: DISCONTINUED | OUTPATIENT
Start: 2021-05-05 | End: 2021-05-05 | Stop reason: HOSPADM

## 2021-05-05 RX ORDER — HYDROCODONE BITARTRATE AND ACETAMINOPHEN 5; 325 MG/1; MG/1
1 TABLET ORAL EVERY 6 HOURS PRN
Qty: 10 TABLET | Refills: 0 | Status: SHIPPED | OUTPATIENT
Start: 2021-05-05 | End: 2021-05-08

## 2021-05-05 RX ADMIN — SODIUM CHLORIDE 1000 ML: 9 INJECTION, SOLUTION INTRAVENOUS at 10:59

## 2021-05-05 RX ADMIN — KETOROLAC TROMETHAMINE 30 MG: 30 INJECTION, SOLUTION INTRAMUSCULAR at 11:00

## 2021-05-05 RX ADMIN — ONDANSETRON 4 MG: 2 INJECTION INTRAMUSCULAR; INTRAVENOUS at 10:59

## 2021-05-05 RX ADMIN — SODIUM CHLORIDE 50 ML: 9 INJECTION, SOLUTION INTRAVENOUS at 12:33

## 2021-05-05 RX ADMIN — IOPAMIDOL 39 ML: 755 INJECTION, SOLUTION INTRAVENOUS at 12:33

## 2021-05-05 ASSESSMENT — ENCOUNTER SYMPTOMS
NAUSEA: 0
DIZZINESS: 0
SHORTNESS OF BREATH: 0
CONSTIPATION: 0
FLANK PAIN: 0
DYSURIA: 0
VOMITING: 0
COLOR CHANGE: 0
EYE REDNESS: 0
WOUND: 0
FATIGUE: 1
COUGH: 0
ABDOMINAL PAIN: 1
CHILLS: 0
BLOOD IN STOOL: 0
DIARRHEA: 0
HEMATURIA: 0
EYE DISCHARGE: 0
FEVER: 0
LIGHT-HEADEDNESS: 0
PALPITATIONS: 0
EYE PAIN: 0
HEADACHES: 0
WHEEZING: 0
EYE ITCHING: 0

## 2021-05-05 NOTE — ED PROVIDER NOTES
History     Chief Complaint   Patient presents with     Abdominal Pain     HPI  Rosita Corrigan is a 40 year old female who with past medical history significant for anxiety, IBS, hx of gestational diabetes who presents to the ER with concerns over abdominal pain.  Pain is generalized throughout her abdomen she describes as a cramping, tightness.  She is unable to identify any clear aggravating or alleviating factors, however notes that pain did start shortly after she ate yesterday afternoon.  She did have significant fatigue last week had negative COVID-19 test at that time and subsequently developed some nasal congestion. She has not had any recent fever, chills, myalgias, sore throat, cough, chest pain, dyspnea, wheezing, nausea, vomiting, diarrhea, melena, hematochezia, dysuria, hematuria, vaginal itching burning or discharge.  She reports that she has had a history of abdominal pain several years ago had extensive evaluation including referral to a GI doctor who performed colonoscopy and endoscopy however states no definitive diagnosis was ever given.  Her current pain feels significantly different, more intense that she had experienced then.  No other pertinent intraabdominal procedures.      Allergies:  Allergies   Allergen Reactions     Omeprazole Other (See Comments) and Difficulty breathing     dizzy     Problem List:    Patient Active Problem List    Diagnosis Date Noted     H/O gestational diabetes mellitus, not currently pregnant 2018     Priority: Medium     Anxiety 2015     Priority: Medium     Irritable bowel syndrome 2015     Priority: Medium      Past Medical History:    Past Medical History:   Diagnosis Date     Gestational diabetes       contractions      Past Surgical History:    Past Surgical History:   Procedure Laterality Date     MOUTH SURGERY      wisdom teeth     Family History:    Family History   Adopted: Yes   Problem Relation Age of Onset     Unknown/Adopted  Daughter      Unknown/Adopted Daughter      Unknown/Adopted Mother      Unknown/Adopted Father      Unknown/Adopted Paternal Grandfather      Unknown/Adopted Paternal Grandmother      Unknown/Adopted Maternal Grandmother      Unknown/Adopted Maternal Grandfather      Unknown/Adopted Brother      Unknown/Adopted Sister      Social History:  Marital Status:   [2]  Social History     Tobacco Use     Smoking status: Never Smoker     Smokeless tobacco: Never Used   Substance Use Topics     Alcohol use: Yes     Comment: occassional     Drug use: No      Medications:    No current outpatient medications on file.    Review of Systems   Constitutional: Positive for fatigue (resolved). Negative for chills and fever.   HENT: Positive for congestion.    Eyes: Negative for pain, discharge, redness, itching and visual disturbance.   Respiratory: Negative for cough, shortness of breath and wheezing.    Cardiovascular: Negative for chest pain and palpitations.   Gastrointestinal: Positive for abdominal pain. Negative for blood in stool, constipation, diarrhea, nausea and vomiting.   Genitourinary: Negative for dysuria, flank pain, hematuria and urgency.   Skin: Negative for color change, rash and wound.   Neurological: Negative for dizziness, light-headedness and headaches.      Physical Exam   BP: 111/71  Pulse: 121  Temp: 97.5  F (36.4  C)  Resp: 16  Weight: 36.3 kg (80 lb)  SpO2: 99 %  Physical Exam  GENERAL APPEARANCE: Thin, alert, cooperative female who does appear in moderate distress due to pain  EYES: EOMI,  PERRL, conjunctiva clear  HENT: ear canals and TM's normal.  Oral mucosa moist, posterior pharynx non-erythematous without exudate   NECK: supple, nontender, no lymphadenopathy  RESP: lungs clear to auscultation - no rales, rhonchi or wheezes  CV: regular rates and rhythm, normal S1 S2, no murmur noted  ABDOMEN:  Abdomen is slightly firm, generalized tenderness to palpation worse in lower abdomen, bowel sounds  normal  NEURO: Normal strength and tone, sensory exam grossly normal,  normal speech and mentation  SKIN: no suspicious lesions or rashes  ED Course        Procedures        Critical Care time:  none        Results for orders placed or performed during the hospital encounter of 05/05/21   CT Abdomen Pelvis w Contrast     Status: None    Narrative    CT ABDOMEN AND PELVIS WITH CONTRAST  5/5/2021 12:45 PM    CLINICAL HISTORY: Abdominal pain, acute, nonlocalized.    TECHNIQUE: CT scan of the abdomen and pelvis was performed following  injection of IV contrast. Multiplanar reformats were obtained. Dose  reduction techniques were used.  CONTRAST: 39 mL Isovue 370    COMPARISON: None.    FINDINGS:   LOWER CHEST: Normal.    HEPATOBILIARY: Dependent sludge is seen within the gallbladder.    PANCREAS: Normal.    SPLEEN: Normal.    ADRENAL GLANDS: Normal.    KIDNEYS/BLADDER: Mild right-sided hydronephrosis and hydroureter is  noted with a caliber change in the adnexal region where there are  multiple cysts on the right ovary. This may be causing compression of  the ureter. No evidence for stone. The left ureter is not dilated.    BOWEL: No evidence for bowel obstruction. No colonic wall thickening  or inflammatory change. Normal appendix.    PELVIC ORGANS: Small amount of free fluid is present within the pelvis  and the right paracolic gutter. Multiple cysts are seen on the right  ovary. There is a low-attenuation lesion in the left adnexal region  measuring 4.0 x 3.4 cm that may represent a hemorrhagic cyst. In the  appropriate clinical setting, a tubo-ovarian abscess would be a  possibility as well.    ADDITIONAL FINDINGS: None.    MUSCULOSKELETAL: Normal.      Impression    IMPRESSION:   1.  Multiple cysts are seen on both ovaries. One on the left ovary  measures up to 4.0 cm and is soft tissue density. This may represent a  hemorrhagic cyst. In the appropriate clinical setting, tubo-ovarian  abscess would be in the  differential as well.  2.  Small amount of free fluid within the pelvis and right paracolic  gutter. This may be due to recent cyst rupture.  3.  Mild right-sided hydronephrosis and hydroureter down to the level  of the ovarian cysts which may be compressing the ureter. No evidence  for stone.    LANDON AGUIRRE MD   US Pelvis Cmplt w Transvag & Doppler LmtPel Duplex Limited     Status: None    Narrative    US PELVIS COMPLETE WITH TRANSVAGINAL AND DOPPLER LIMITED    5/5/2021  2:55 PM     HISTORY: Left lower quadrant pain.    TECHNIQUE: Transvaginal imaging was added to the transabdominal scans.  Spectral Doppler and wave form analysis was also performed to evaluate  blood flow to the ovaries.    COMPARISON: CT of the abdomen and pelvis performed earlier today.    FINDINGS: The uterus is measured at 9.3 x 3.6 x 4.4 cm. No fibroids  are evident. Endometrial stripe measures 1 cm and is within normal  limits for a premenopausal patient. The right ovary contains 2 complex  cystic lesions, with the largest measuring 2.6 cm. The left ovary  contains a complex cystic lesion measuring 6.4 cm. No solid adnexal  masses are identified. Trace amount of free pelvic fluid is present.  Spectral Doppler and waveform analysis demonstrate arterial and venous  flow to both ovaries.       Impression    IMPRESSION:   1. There are complex cystic lesions in the ovaries bilaterally, with  the largest on the left measuring 6.4 cm. Differential considerations  include endometriomas and hemorrhagic cysts. Tubo-ovarian abscess is  considered less likely from this appearance. Consider pelvic MRI for  further evaluation.  2. Otherwise unremarkable pelvic ultrasound with Doppler. No  convincing sonographic evidence for ovarian torsion.    HUONG ELIZONDO MD   CBC with platelets differential     Status: None   Result Value Ref Range    WBC 9.8 4.0 - 11.0 10e9/L    RBC Count 4.41 3.8 - 5.2 10e12/L    Hemoglobin 12.2 11.7 - 15.7 g/dL     Hematocrit 37.2 35.0 - 47.0 %    MCV 84 78 - 100 fl    MCH 27.7 26.5 - 33.0 pg    MCHC 32.8 31.5 - 36.5 g/dL    RDW 13.2 10.0 - 15.0 %    Platelet Count 290 150 - 450 10e9/L    Diff Method Automated Method     % Neutrophils 81.4 %    % Lymphocytes 12.5 %    % Monocytes 5.3 %    % Eosinophils 0.2 %    % Basophils 0.2 %    % Immature Granulocytes 0.4 %    Nucleated RBCs 0 0 /100    Absolute Neutrophil 8.0 1.6 - 8.3 10e9/L    Absolute Lymphocytes 1.2 0.8 - 5.3 10e9/L    Absolute Monocytes 0.5 0.0 - 1.3 10e9/L    Absolute Eosinophils 0.0 0.0 - 0.7 10e9/L    Absolute Basophils 0.0 0.0 - 0.2 10e9/L    Abs Immature Granulocytes 0.0 0 - 0.4 10e9/L    Absolute Nucleated RBC 0.0    Comprehensive metabolic panel     Status: Abnormal   Result Value Ref Range    Sodium 137 133 - 144 mmol/L    Potassium 4.0 3.4 - 5.3 mmol/L    Chloride 103 94 - 109 mmol/L    Carbon Dioxide 26 20 - 32 mmol/L    Anion Gap 8 3 - 14 mmol/L    Glucose 109 (H) 70 - 99 mg/dL    Urea Nitrogen 11 7 - 30 mg/dL    Creatinine 0.60 0.52 - 1.04 mg/dL    GFR Estimate >90 >60 mL/min/[1.73_m2]    GFR Estimate If Black >90 >60 mL/min/[1.73_m2]    Calcium 8.7 8.5 - 10.1 mg/dL    Bilirubin Total 0.7 0.2 - 1.3 mg/dL    Albumin 3.8 3.4 - 5.0 g/dL    Protein Total 8.0 6.8 - 8.8 g/dL    Alkaline Phosphatase 78 40 - 150 U/L    ALT 16 0 - 50 U/L    AST 19 0 - 45 U/L   Lipase     Status: Abnormal   Result Value Ref Range    Lipase 50 (L) 73 - 393 U/L   Lactic acid whole blood     Status: None   Result Value Ref Range    Lactic Acid 1.1 0.7 - 2.0 mmol/L   UA with Microscopic reflex to Culture     Status: Abnormal    Specimen: Midstream Urine   Result Value Ref Range    Color Urine Yellow     Appearance Urine Clear     Glucose Urine Negative NEG^Negative mg/dL    Bilirubin Urine Negative NEG^Negative    Ketones Urine Negative NEG^Negative mg/dL    Specific Gravity Urine 1.011 1.003 - 1.035    Blood Urine Negative NEG^Negative    pH Urine 6.0 5.0 - 7.0 pH    Protein Albumin  Urine Negative NEG^Negative mg/dL    Urobilinogen mg/dL 0.0 0.0 - 2.0 mg/dL    Nitrite Urine Negative NEG^Negative    Leukocyte Esterase Urine Negative NEG^Negative    Source Midstream Urine     WBC Urine 1 0 - 5 /HPF    RBC Urine 0 0 - 2 /HPF    Bacteria Urine Few (A) NEG^Negative /HPF    Squamous Epithelial /HPF Urine 1 0 - 1 /HPF    Mucous Urine Present (A) NEG^Negative /LPF   HCG qualitative pregnancy (blood)     Status: None   Result Value Ref Range    HCG Qualitative Serum Negative NEG^Negative     Medications   0.9% sodium chloride BOLUS (0 mLs Intravenous Stopped 5/5/21 1316)   ketorolac (TORADOL) injection 30 mg (30 mg Intramuscular Given 5/5/21 1100)   iopamidol (ISOVUE-370) solution 39 mL (39 mLs Intravenous Given 5/5/21 1233)   sodium chloride 0.9 % bag 500mL for CT scan flush use (50 mLs Intravenous Given 5/5/21 1233)     Assessments & Plan (with Medical Decision Making)     I have reviewed the nursing notes.  I have reviewed the findings, diagnosis, plan and need for follow up with the patient.       Discharge Medication List as of 5/5/2021  3:20 PM      START taking these medications    Details   HYDROcodone-acetaminophen (NORCO) 5-325 MG tablet Take 1 tablet by mouth every 6 hours as needed for pain, Disp-10 tablet, R-0, E-Prescribe           Final diagnoses:   Abdominal pain, generalized   Ovarian cyst     40-year-old female presents to the emergency department accompanied by significant other with concerns of her significant generalized abdominal pain that developed over the last 24 hours.  She had lab evaluation which was noncontributory CBC, CMP, lactate, urinalysis, negative pregnancy test.  Given degree of discomfort however did obtain CT of her abdomen and pelvis which showed multiple cysts in both ovaries, 1 on the left ovary measures up to 4 cm and the soft tissue density, may represent a hemorrhagic cyst.  In the appropriate clinical setting, tubo-ovarian abscess been the differential as  well.  Small amount of free fluid within the pelvis and right paracolic gutter may be due to recent cyst rupture which I suspect was the cause of patient's pain.  She also had mild right-sided hydronephrosis and hydroureter down to the level of the ovarian cyst which may be compressing the ureter.  No evidence of stone.  I did discuss ase with OB/GYN on-call Dr. Robert who stated would be appropriate to order pelvic ultrasound for further evaluation, outpatient follow-up in OB/GYN clinic to discuss management of cysts.  US showed numerous ovarian cysts, hemorrhagic cyst, endometritis would be in differential.  She had significant improvement in the department and was discharged home with instructions to follow up with OB/GYN clinic as directed.  Recurrence of severe pain, fever, other worrisome reasons to return to ER sooner discussed.     Disclaimer: This note consists of symbols derived from keyboarding, dictation, and/or voice recognition software. As a result, there may be errors in the script that have gone undetected.  Please consider this when interpreting information found in the chart.    5/5/2021   [facility] EMERGENCY DEPT     Christina Hebert PA-C  05/11/21 [id number]

## 2021-05-06 ENCOUNTER — TELEPHONE (OUTPATIENT)
Dept: OBGYN | Facility: CLINIC | Age: 41
End: 2021-05-06

## 2021-05-06 NOTE — TELEPHONE ENCOUNTER
Reason for Call:  Other     Detailed comments:  Pt called stating she was instructed to make an OB/GYN follow up apt this week with Dr. Mesa following her ER visit yesterday (05/05/2021). Pt said she was unable to make an apt because there was no availability until June.    Phone Number Patient can be reached at: Home number on file [phone] (home)    Best Time: any    Can we leave a detailed message on this number? YES    Call taken on 5/6/2021 at 9:49 AM by Luis Alberto Roberto

## 2021-05-06 NOTE — TELEPHONE ENCOUNTER
Office visit scheduled with Dr. Carvalho on Thursday 5/13/221 at 1030.    Bria Ortiz   Ob/Gyn Clinic  RN

## 2021-05-13 ENCOUNTER — HOSPITAL ENCOUNTER (OUTPATIENT)
Facility: CLINIC | Age: 41
End: 2021-05-13
Attending: OBSTETRICS & GYNECOLOGY | Admitting: OBSTETRICS & GYNECOLOGY
Payer: COMMERCIAL

## 2021-05-13 ENCOUNTER — OFFICE VISIT (OUTPATIENT)
Dept: OBGYN | Facility: CLINIC | Age: 41
End: 2021-05-13
Payer: COMMERCIAL

## 2021-05-13 ENCOUNTER — TELEPHONE (OUTPATIENT)
Dept: OBGYN | Facility: CLINIC | Age: 41
End: 2021-05-13

## 2021-05-13 VITALS
SYSTOLIC BLOOD PRESSURE: 114 MMHG | WEIGHT: 82 LBS | BODY MASS INDEX: 18.97 KG/M2 | TEMPERATURE: 97.7 F | HEART RATE: 100 BPM | RESPIRATION RATE: 16 BRPM | DIASTOLIC BLOOD PRESSURE: 89 MMHG | HEIGHT: 55 IN

## 2021-05-13 DIAGNOSIS — N83.291 COMPLEX CYST OF BOTH OVARIES: ICD-10-CM

## 2021-05-13 DIAGNOSIS — N83.292 COMPLEX CYST OF BOTH OVARIES: Primary | ICD-10-CM

## 2021-05-13 DIAGNOSIS — N83.291 COMPLEX CYST OF BOTH OVARIES: Primary | ICD-10-CM

## 2021-05-13 DIAGNOSIS — Z12.4 SCREENING FOR MALIGNANT NEOPLASM OF CERVIX: ICD-10-CM

## 2021-05-13 DIAGNOSIS — N83.292 COMPLEX CYST OF BOTH OVARIES: ICD-10-CM

## 2021-05-13 LAB — CANCER AG125 SERPL-ACNC: 136 U/ML (ref 0–30)

## 2021-05-13 PROCEDURE — 86304 IMMUNOASSAY TUMOR CA 125: CPT | Performed by: OBSTETRICS & GYNECOLOGY

## 2021-05-13 PROCEDURE — 99204 OFFICE O/P NEW MOD 45 MIN: CPT | Mod: 25 | Performed by: OBSTETRICS & GYNECOLOGY

## 2021-05-13 PROCEDURE — 36415 COLL VENOUS BLD VENIPUNCTURE: CPT | Performed by: OBSTETRICS & GYNECOLOGY

## 2021-05-13 PROCEDURE — 87624 HPV HI-RISK TYP POOLED RSLT: CPT | Performed by: OBSTETRICS & GYNECOLOGY

## 2021-05-13 PROCEDURE — G0145 SCR C/V CYTO,THINLAYER,RESCR: HCPCS | Performed by: OBSTETRICS & GYNECOLOGY

## 2021-05-13 RX ORDER — CEFAZOLIN SODIUM 2 G/100ML
2 INJECTION, SOLUTION INTRAVENOUS SEE ADMIN INSTRUCTIONS
Status: CANCELLED | OUTPATIENT
Start: 2021-05-13

## 2021-05-13 RX ORDER — CEFAZOLIN SODIUM 2 G/100ML
2 INJECTION, SOLUTION INTRAVENOUS
Status: CANCELLED | OUTPATIENT
Start: 2021-05-13

## 2021-05-13 RX ORDER — ACETAMINOPHEN 325 MG/1
975 TABLET ORAL ONCE
Status: CANCELLED | OUTPATIENT
Start: 2021-05-13 | End: 2021-05-13

## 2021-05-13 RX ORDER — PHENAZOPYRIDINE HYDROCHLORIDE 100 MG/1
200 TABLET, FILM COATED ORAL ONCE
Status: CANCELLED | OUTPATIENT
Start: 2021-05-13 | End: 2021-05-13

## 2021-05-13 RX ORDER — OXYCODONE HCL 10 MG/1
10 TABLET, FILM COATED, EXTENDED RELEASE ORAL ONCE
Status: CANCELLED | OUTPATIENT
Start: 2021-05-13 | End: 2021-05-13

## 2021-05-13 ASSESSMENT — MIFFLIN-ST. JEOR: SCORE: 884.08

## 2021-05-13 NOTE — NURSING NOTE
"Initial /89 (BP Location: Right arm, Patient Position: Chair, Cuff Size: Adult Regular)   Pulse 100   Temp 97.7  F (36.5  C) (Tympanic)   Resp 16   Ht 1.397 m (4' 7\")   Wt 37.2 kg (82 lb)   LMP 04/23/2021   Breastfeeding No   BMI 19.06 kg/m   Estimated body mass index is 19.06 kg/m  as calculated from the following:    Height as of this encounter: 1.397 m (4' 7\").    Weight as of this encounter: 37.2 kg (82 lb). .      "

## 2021-05-13 NOTE — PROGRESS NOTES
Rosita is a 40 year old   female who presents for evaluation after having been seen in ER  with acute exacerbation of lower abdominal pain; she states she was on BCP from age [age], then  had vasectomy, so no further pills; menses are generally regular, last 6-7 days, not heavy but have longstanding cramping, left side>right side.  She also has IBS but no bladder symptoms.  She reports over last few months, she has felt like her hormones are wildly fluctuating, getting more headaches; then she got fairly sharp, severe LLQ pain, radiating into entire lower abdomen; workup in ER was neg for pregnancy or infection (normal CBC), no anemia;  She had CT scan and pelvic ultrasound which both showed complex cysts in both ovaries, areas of solid and irregular cystic areas with small amount of free fluid the largest on the left side measuring 6.4cm  She denies fever, chills or foul vaginal odor, no new sexual partners..    Patient Active Problem List    Diagnosis Date Noted     Complex cyst of both ovaries 2021     Priority: Medium     Added automatically from request for surgery [id number]       H/O gestational diabetes mellitus, not currently pregnant 2018     Priority: Medium     Anxiety 2015     Priority: Medium     Irritable bowel syndrome 2015     Priority: Medium       All systems were reviewed and pertinent information in noted in subjective/HPI.    Past Medical History:   Diagnosis Date     Gestational diabetes       contractions        Past Surgical History:   Procedure Laterality Date     MOUTH SURGERY      wisdom teeth       No current outpatient medications on file.    ALLERGIES:  Omeprazole    Social History     Socioeconomic History     Marital status:      Spouse name: None     Number of children: None     Years of education: None     Highest education level: None   Occupational History     None   Social Needs     Financial resource strain: None     Food  "insecurity     Worry: None     Inability: None     Transportation needs     Medical: None     Non-medical: None   Tobacco Use     Smoking status: Never Smoker     Smokeless tobacco: Never Used   Substance and Sexual Activity     Alcohol use: Yes     Comment: occassional     Drug use: No     Sexual activity: Yes     Partners: Male     Birth control/protection: Surgical, Condom     Comment: vasectomy   Lifestyle     Physical activity     Days per week: None     Minutes per session: None     Stress: None   Relationships     Social connections     Talks on phone: None     Gets together: None     Attends L[employer] service: None     Active member of club or organization: None     Attends meetings of clubs or organizations: None     Relationship status: None     Intimate partner violence     Fear of current or ex partner: None     Emotionally abused: None     Physically abused: None     Forced sexual activity: None   Other Topics Concern     Parent/sibling w/ CABG, MI or angioplasty before 65F 55M? No     Comment: adopted-unknown   Social History Narrative     None       Family History   Adopted: Yes   Problem Relation Age of Onset     Unknown/Adopted Daughter      Unknown/Adopted Daughter      Unknown/Adopted Mother      Unknown/Adopted Father      Unknown/Adopted Paternal Grandfather      Unknown/Adopted Paternal Grandmother      Unknown/Adopted Maternal Grandmother      Unknown/Adopted Maternal Grandfather      Unknown/Adopted Brother      Unknown/Adopted Sister        OBJECTIVE:  Vitals: /89 (BP Location: Right arm, Patient Position: Chair, Cuff Size: Adult Regular)   Pulse 100   Temp 97.7  F (36.5  C) (Tympanic)   Resp 16   Ht 1.397 m (4' 7\")   Wt 37.2 kg (82 lb)   LMP 04/23/2021   Breastfeeding No   BMI 19.06 kg/m   BMI= Body mass index is 19.06 kg/m .   Patient's last menstrual period was 04/23/2021.     GENERAL APPEARANCE: healthy, alert and no distress  ABDOMEN:  soft, nontender, no " hepato-splenomegaly or hernias  PELVIC:  EGBUS:  within normal limits  VAGINA:  normoestrogenic, well-supported, no unusual discharge  CERVIX:  smooth, non-friable, no gross lesions, thin-layer PAP was taken  UTERUS:  Mid position, very fixed and tender throughout pelvis  ADNEXAE:  Not palpable separate from uterus and not mobile  BREAST: no masses    ASSESSMENT:      ICD-10-CM    1. Complex cyst of both ovaries  N83.291     N83.292 Case Request: Diagnostic laparoscopy, possible ovarian cystectomy, bilateral     Case Request: Diagnostic laparoscopy, possible ovarian cystectomy, bilateral   2. Screening for malignant neoplasm of cervix  Z12.4 Pap imaged thin layer screen with HPV - recommended age 30 - 65 years (select HPV order below)     HPV High Risk Types DNA Cervical       PLAN:  I discussed with Rosita that imaging and exam cannot fully establish the cause of her pain but likely indicate a bilateral process like endometriosiis.  I think unlikely to be PID or cancer, however suggest CA-125 to help further clarify  I discussed diagnostic laparoscopy with possible bilateral ovarian cystectomies; we can use the laparoscopy to establish cause of pain, extent of process and help guide future therapeutic interventions  She is in agreement  Luz Elena Carvalho MD  [facility]      Luz Elena Carvalho MD

## 2021-05-13 NOTE — TELEPHONE ENCOUNTER
"[id number]  Rosita Corrigan    You are now scheduled for surgery at [facility].  Below are the details for your surgery.  Please read the \"Preparing for Your Surgery\" instructions and let us know if you have any questions.    Type of surgery: Diagnostic laparoscopy, possible ovarian cystectomy, bilateral (Bilateral)   Surgeon:  Luz Elena Carvalho MD  Location of surgery: [facility] OR    Date of surgery: 6-8-21    Time: 10:10am   Arrival Time: 9:10am    Time can change, to be confirmed a couple of days prior by pre-op surgery nurse.    Pre-Op Appt Date: Patient to schedule with a PCP or Family Practice Provider within 30 days to the surgery.  Post-Op Appt Date: To be determined by provider     COVID test 2-4 days prior at [facility]  Date 6-4-21  Time 3:30pm    Packet sent out: Yes  Pre-cert/Authorization completed:  TBD by Financial Securing Office.   MA Sterilization/Hysterectomy Acknowledgment Consent signed: Not Applicable    [facility]  [phone]    Fax: [phone]  Same Day Surgery [phone]  Fax: [phone]  [facility] [phone]    "

## 2021-05-14 VITALS — HEIGHT: 55 IN | BODY MASS INDEX: 18.97 KG/M2 | WEIGHT: 82 LBS

## 2021-05-14 DIAGNOSIS — N83.292 COMPLEX CYST OF BOTH OVARIES: Primary | ICD-10-CM

## 2021-05-14 DIAGNOSIS — N83.291 COMPLEX CYST OF BOTH OVARIES: Primary | ICD-10-CM

## 2021-05-14 ASSESSMENT — MIFFLIN-ST. JEOR: SCORE: 884.08

## 2021-05-14 NOTE — RESULT ENCOUNTER NOTE
Pt notified of below.  Referral generated.  GYN ONC  will call patient with appointment after referral has been reviewed.  Pt reports understanding.  Pt does not have further questions or concerns.  Patient will check back with us if she does not here by late next week.    Bria Ortiz   Ob/Gyn Clinic  RN

## 2021-05-14 NOTE — TELEPHONE ENCOUNTER
RECORDS STATUS - ALL OTHER DIAGNOSIS      RECORDS RECEIVED FROM: [facility]   DATE RECEIVED: 5/17/2021   NOTES STATUS DETAILS   OFFICE NOTE from referring provider     OFFICE NOTE from medical oncologist N/A    DISCHARGE SUMMARY from hospital N/A    DISCHARGE REPORT from the ER Complete 5/5/2021 Abdominal Pain, Ovarian cyst   OPERATIVE REPORT N/A    MEDICATION LIST N/A    CLINICAL TRIAL TREATMENTS TO DATE     LABS     PATHOLOGY REPORTS N/A    ANYTHING RELATED TO DIAGNOSIS Complete Labs last updated on 5/13/2021   GENONOMIC TESTING     TYPE:     IMAGING (NEED IMAGES & REPORT)     CT SCANS Complete CT abdomen Pelvis 5/5/2021, 5/20/2015    MRI     MAMMO     ULTRASOUND Complete US Pelvis Complete 5/5/2021   PET

## 2021-05-14 NOTE — NURSING NOTE
Rosita is a 40 year old who is being evaluated via a billable video visit.      How would you like to obtain your AVS? Youk[employer]art  If the video visit is dropped, the invitation should be resent by: Text to cell phone: [phone]  Will anyone else be joining your video visit? No      Video-Visit Details    Type of service:  Video Visit      Originating Location (pt. Location): Home in [place]    Distant Location (provider location):  [facility]     Platform used for Video Visit: [facility]     SYMPTOM QUESTIONNAIRE    General: Increased fatigue, change in appetite last week, ok now, night sweats, chills 2 weeks ago    Head/Eyes: no    Ears/Nose/Throat: no    Cardiovascular: no    Respiratory: Choking sensation in her throat happened 2 times last week, happens very randomly (states she is ok, would go to the ER if symptoms worsened.)    Gastrointestinal: Constipation    Genitourinary: Sharp shooting pains up the vaginal area    Sexual Function: no    Musculoskeletal: no    Skin: no    Neurological: no    Mental Health: no    Endocrine: Throat choking sensation, could it be a sign that something is wrong with her thyroid?    Concerns:  All the above

## 2021-05-15 NOTE — PROGRESS NOTES
Gynecologic Oncology Clinic - New Patient Virtual Visit    Patient is being seen as a virtual visit. Consent has been obtained and documented in chart.    Referring provider:    Luz Elena Carvalho MD  [address] [place] [address]    Patient: Rosita Corrigan  : 1980    Date of Visit: May 17, 2021     Chief Complaint: complex adnexal cysts    History of Present Illness:  Rosita Corrigan is a 40 year old  pre-menopausal female who presents with complex adnexal cysts.     She initially presented to ED on 2021 (per ED notes review) with abdominal pain. TVUS and CT showed complex adnexal cysts possibly consistent with hemorrhagic cysts.  elevated to 136 on 2021.    She reports the severe pain has improved. She has noted increasingly heavy painful menses. She continues to have regular periods. She had discussed having surgery with primary gynecologist, but due to elevated  presents to our clinic.          Review of Systems:  +fatigue, change in appetite, night sweats, chills, constipation, pelvic pain, all others negative      OB/Gynecologic History:  ,  x 2. She has 2 daughters. Youngest in 9 years old. Partner has vasectomy.   She has history of OCP use for about 10 years.  Regular menses, every month every 28-30 days. Last 6 days. She has increasingly painful. Not heavy.   Last Pap Smear: 2021: In process. History of abnormal: No    Past Medical History  Past Medical History:   Diagnosis Date     Gestational diabetes       contractions        Past Surgical History  Past Surgical History:   Procedure Laterality Date     MOUTH SURGERY      wisdom teeth       Social History  Social History     Tobacco Use     Smoking status: Never Smoker     Smokeless tobacco: Never Used   Substance Use Topics     Alcohol use: Yes     Comment: occassional     Drug use: No    She lives with her  and two daughters. She works as a .    Family  "History  Patient is adopted, unknown family history.    No current outpatient medications on file.        Allergies  Allergies   Allergen Reactions     Omeprazole Other (See Comments) and Difficulty breathing     dizzy       Physical Exam:   Ht 1.397 m (4' 7\")   Wt 37.2 kg (82 lb)   LMP 04/23/2021 (Exact Date)   BMI 19.06 kg/m    General appearance: Alert and oriented, no acute distress, well groomed  Virtual visit    Pathology:   n/a    Labs:  Lab Results   Component Value Date     136 (H) 05/13/2021       Imaging:   CT Abdomen Pelvis w Contrast  Narrative: CT ABDOMEN AND PELVIS WITH CONTRAST  5/5/2021 12:45 PM    CLINICAL HISTORY: Abdominal pain, acute, nonlocalized.    TECHNIQUE: CT scan of the abdomen and pelvis was performed following  injection of IV contrast. Multiplanar reformats were obtained. Dose  reduction techniques were used.  CONTRAST: 39 mL Isovue 370    COMPARISON: None.    FINDINGS:   LOWER CHEST: Normal.    HEPATOBILIARY: Dependent sludge is seen within the gallbladder.    PANCREAS: Normal.    SPLEEN: Normal.    ADRENAL GLANDS: Normal.    KIDNEYS/BLADDER: Mild right-sided hydronephrosis and hydroureter is  noted with a caliber change in the adnexal region where there are  multiple cysts on the right ovary. This may be causing compression of  the ureter. No evidence for stone. The left ureter is not dilated.    BOWEL: No evidence for bowel obstruction. No colonic wall thickening  or inflammatory change. Normal appendix.    PELVIC ORGANS: Small amount of free fluid is present within the pelvis  and the right paracolic gutter. Multiple cysts are seen on the right  ovary. There is a low-attenuation lesion in the left adnexal region  measuring 4.0 x 3.4 cm that may represent a hemorrhagic cyst. In the  appropriate clinical setting, a tubo-ovarian abscess would be a  possibility as well.    ADDITIONAL FINDINGS: None.    MUSCULOSKELETAL: Normal.  Impression: IMPRESSION:   1.  Multiple cysts are " seen on both ovaries. One on the left ovary  measures up to 4.0 cm and is soft tissue density. This may represent a  hemorrhagic cyst. In the appropriate clinical setting, tubo-ovarian  abscess would be in the differential as well.  2.  Small amount of free fluid within the pelvis and right paracolic  gutter. This may be due to recent cyst rupture.  3.  Mild right-sided hydronephrosis and hydroureter down to the level  of the ovarian cysts which may be compressing the ureter. No evidence  for stone.    LANDON AGUIRRE MD  US Pelvis Cmplt w Transvag & Doppler LmtPel Duplex Limited  Narrative: US PELVIS COMPLETE WITH TRANSVAGINAL AND DOPPLER LIMITED    5/5/2021  2:55 PM     HISTORY: Left lower quadrant pain.    TECHNIQUE: Transvaginal imaging was added to the transabdominal scans.  Spectral Doppler and wave form analysis was also performed to evaluate  blood flow to the ovaries.    COMPARISON: CT of the abdomen and pelvis performed earlier today.    FINDINGS: The uterus is measured at 9.3 x 3.6 x 4.4 cm. No fibroids  are evident. Endometrial stripe measures 1 cm and is within normal  limits for a premenopausal patient. The right ovary contains 2 complex  cystic lesions, with the largest measuring 2.6 cm. The left ovary  contains a complex cystic lesion measuring 6.4 cm. No solid adnexal  masses are identified. Trace amount of free pelvic fluid is present.  Spectral Doppler and waveform analysis demonstrate arterial and venous  flow to both ovaries.   Impression: IMPRESSION:   1. There are complex cystic lesions in the ovaries bilaterally, with  the largest on the left measuring 6.4 cm. Differential considerations  include endometriomas and hemorrhagic cysts. Tubo-ovarian abscess is  considered less likely from this appearance. Consider pelvic MRI for  further evaluation.  2. Otherwise unremarkable pelvic ultrasound with Doppler. No  convincing sonographic evidence for ovarian torsion.    HUONG ELIZONDO,  MD    Results for orders placed during the hospital encounter of 05/05/21   CT Abdomen Pelvis w Contrast    Narrative CT ABDOMEN AND PELVIS WITH CONTRAST  5/5/2021 12:45 PM    CLINICAL HISTORY: Abdominal pain, acute, nonlocalized.    TECHNIQUE: CT scan of the abdomen and pelvis was performed following  injection of IV contrast. Multiplanar reformats were obtained. Dose  reduction techniques were used.  CONTRAST: 39 mL Isovue 370    COMPARISON: None.    FINDINGS:   LOWER CHEST: Normal.    HEPATOBILIARY: Dependent sludge is seen within the gallbladder.    PANCREAS: Normal.    SPLEEN: Normal.    ADRENAL GLANDS: Normal.    KIDNEYS/BLADDER: Mild right-sided hydronephrosis and hydroureter is  noted with a caliber change in the adnexal region where there are  multiple cysts on the right ovary. This may be causing compression of  the ureter. No evidence for stone. The left ureter is not dilated.    BOWEL: No evidence for bowel obstruction. No colonic wall thickening  or inflammatory change. Normal appendix.    PELVIC ORGANS: Small amount of free fluid is present within the pelvis  and the right paracolic gutter. Multiple cysts are seen on the right  ovary. There is a low-attenuation lesion in the left adnexal region  measuring 4.0 x 3.4 cm that may represent a hemorrhagic cyst. In the  appropriate clinical setting, a tubo-ovarian abscess would be a  possibility as well.    ADDITIONAL FINDINGS: None.    MUSCULOSKELETAL: Normal.      Impression IMPRESSION:   1.  Multiple cysts are seen on both ovaries. One on the left ovary  measures up to 4.0 cm and is soft tissue density. This may represent a  hemorrhagic cyst. In the appropriate clinical setting, tubo-ovarian  abscess would be in the differential as well.  2.  Small amount of free fluid within the pelvis and right paracolic  gutter. This may be due to recent cyst rupture.  3.  Mild right-sided hydronephrosis and hydroureter down to the level  of the ovarian cysts which may  be compressing the ureter. No evidence  for stone.    LANDON AGUIRRE MD         Assessment:  Rosita Corrigan is a 40 year old  pre-menopausal female with bilateral complex ovarian cysts with elevated  concerning for probable endometriomas but with concern for malignancy.    Plan:   1. We discussed the differential for adnexal masses, which included benign, borderline, and malignant conditions. We reviewed that we cannot know for certain which a mass is without surgery to remove the mass; however, we can use patient factors such as age and family history, imaging, and lab results to guide our differential.  2. We discussed limitations of  in pre-menopausal women. Based upon the appearance of the masses and the level of elevation of CA-125 I think these are most likely endometriomas based upon their appearance on my review of her ultrasound images. However, again, we cannot know for certain without pathology. In her case the following treatment options were discussed: observation (briefly only as an option), ovarian suppression, and surgical resection. I would favor surgical exploration and resection  3. If these are in fact endometriomas, options would include ovarian cystectomy with subsequent ovarian suppression (with oral cottraceptive pills or other combined hormonal option or Depo Provera. Without suppression, there is the risk of recurrence of cysts. Even with suppression there is a risk, albeit lower, of recurrence. Definitive treatment is bilateral salpingo-oophorectomy, but this comes with risks of pre-mature surgical menopause, which are only partially mitigated with use of exogenous hormone replacement.  4. Given she does not desire future fertility, I think Diagnostic laparoscopy with probable unilateral oophorectomy to remove the most abnormal ovary and contralateral ovarian cystectomy is a good balance of risks/benefits. I will send her ovary for frozen section and proceed with  staging if indicated. Staging includes bilateral removal of ovaries as well as hysterectomy and removal of lymph nodes. Given her negative CT scan and normal BMI, I think laparoscopic staging is reasonable if indicated and feasible. Otherwise conversion to open would be required.   5. We did discuss the possibility of scar tissue and need for open surgery if this is endometriosis. If that is the case, I would recommend removal of uterus and bilateral ovaries.   6. She would like to proceed with Diagnostic laparoscopy, probable removal of left ovary (largest and most abnormal by ultrasound, but will plan to remove the most abnormal appearing), with cystectomy for the contralateral ovary. She is aware of the possibility of bilateral removal and implications. Also aware of fertility impllications.   7. I will confirm desire to remove bilateral fallopian tubes day of surgery, which would result in sterilization even if ovary is retained, but which may confer decreased risk of ovarian cancer without increase in risk of the procedure or affect on hormones.    I spent a total of 60 minutes on care related to Rosita Corrigan's visit today including a total of 45 minutes in direct video visit with patient and her , with the remainder of the time spent in chart review and charting.    Abdi Valero MD    Division of Gynecologic Oncology  Department of Ob/Gyn and Women's Health  [facility]

## 2021-05-15 NOTE — H&P (VIEW-ONLY)
Gynecologic Oncology Clinic - New Patient Virtual Visit    Patient is being seen as a virtual visit. Consent has been obtained and documented in chart.    Referring provider:    Luz Elena Carvalho MD  [address] [place] [address]    Patient: Rosita Corrigan  : 1980    Date of Visit: May 17, 2021     Chief Complaint: complex adnexal cysts    History of Present Illness:  Rosita Corrigan is a 40 year old  pre-menopausal female who presents with complex adnexal cysts.     She initially presented to ED on 2021 (per ED notes review) with abdominal pain. TVUS and CT showed complex adnexal cysts possibly consistent with hemorrhagic cysts.  elevated to 136 on 2021.    She reports the severe pain has improved. She has noted increasingly heavy painful menses. She continues to have regular periods. She had discussed having surgery with primary gynecologist, but due to elevated  presents to our clinic.          Review of Systems:  +fatigue, change in appetite, night sweats, chills, constipation, pelvic pain, all others negative      OB/Gynecologic History:  ,  x 2. She has 2 daughters. Youngest in 9 years old. Partner has vasectomy.   She has history of OCP use for about 10 years.  Regular menses, every month every 28-30 days. Last 6 days. She has increasingly painful. Not heavy.   Last Pap Smear: 2021: In process. History of abnormal: No    Past Medical History  Past Medical History:   Diagnosis Date     Gestational diabetes       contractions        Past Surgical History  Past Surgical History:   Procedure Laterality Date     MOUTH SURGERY      wisdom teeth       Social History  Social History     Tobacco Use     Smoking status: Never Smoker     Smokeless tobacco: Never Used   Substance Use Topics     Alcohol use: Yes     Comment: occassional     Drug use: No    She lives with her  and two daughters. She works as a .    Family  "History  Patient is adopted, unknown family history.    No current outpatient medications on file.        Allergies  Allergies   Allergen Reactions     Omeprazole Other (See Comments) and Difficulty breathing     dizzy       Physical Exam:   Ht 1.397 m (4' 7\")   Wt 37.2 kg (82 lb)   LMP 04/23/2021 (Exact Date)   BMI 19.06 kg/m    General appearance: Alert and oriented, no acute distress, well groomed  Virtual visit    Pathology:   n/a    Labs:  Lab Results   Component Value Date     136 (H) 05/13/2021       Imaging:   CT Abdomen Pelvis w Contrast  Narrative: CT ABDOMEN AND PELVIS WITH CONTRAST  5/5/2021 12:45 PM    CLINICAL HISTORY: Abdominal pain, acute, nonlocalized.    TECHNIQUE: CT scan of the abdomen and pelvis was performed following  injection of IV contrast. Multiplanar reformats were obtained. Dose  reduction techniques were used.  CONTRAST: 39 mL Isovue 370    COMPARISON: None.    FINDINGS:   LOWER CHEST: Normal.    HEPATOBILIARY: Dependent sludge is seen within the gallbladder.    PANCREAS: Normal.    SPLEEN: Normal.    ADRENAL GLANDS: Normal.    KIDNEYS/BLADDER: Mild right-sided hydronephrosis and hydroureter is  noted with a caliber change in the adnexal region where there are  multiple cysts on the right ovary. This may be causing compression of  the ureter. No evidence for stone. The left ureter is not dilated.    BOWEL: No evidence for bowel obstruction. No colonic wall thickening  or inflammatory change. Normal appendix.    PELVIC ORGANS: Small amount of free fluid is present within the pelvis  and the right paracolic gutter. Multiple cysts are seen on the right  ovary. There is a low-attenuation lesion in the left adnexal region  measuring 4.0 x 3.4 cm that may represent a hemorrhagic cyst. In the  appropriate clinical setting, a tubo-ovarian abscess would be a  possibility as well.    ADDITIONAL FINDINGS: None.    MUSCULOSKELETAL: Normal.  Impression: IMPRESSION:   1.  Multiple cysts are " seen on both ovaries. One on the left ovary  measures up to 4.0 cm and is soft tissue density. This may represent a  hemorrhagic cyst. In the appropriate clinical setting, tubo-ovarian  abscess would be in the differential as well.  2.  Small amount of free fluid within the pelvis and right paracolic  gutter. This may be due to recent cyst rupture.  3.  Mild right-sided hydronephrosis and hydroureter down to the level  of the ovarian cysts which may be compressing the ureter. No evidence  for stone.    LANDON AGUIRRE MD  US Pelvis Cmplt w Transvag & Doppler LmtPel Duplex Limited  Narrative: US PELVIS COMPLETE WITH TRANSVAGINAL AND DOPPLER LIMITED    5/5/2021  2:55 PM     HISTORY: Left lower quadrant pain.    TECHNIQUE: Transvaginal imaging was added to the transabdominal scans.  Spectral Doppler and wave form analysis was also performed to evaluate  blood flow to the ovaries.    COMPARISON: CT of the abdomen and pelvis performed earlier today.    FINDINGS: The uterus is measured at 9.3 x 3.6 x 4.4 cm. No fibroids  are evident. Endometrial stripe measures 1 cm and is within normal  limits for a premenopausal patient. The right ovary contains 2 complex  cystic lesions, with the largest measuring 2.6 cm. The left ovary  contains a complex cystic lesion measuring 6.4 cm. No solid adnexal  masses are identified. Trace amount of free pelvic fluid is present.  Spectral Doppler and waveform analysis demonstrate arterial and venous  flow to both ovaries.   Impression: IMPRESSION:   1. There are complex cystic lesions in the ovaries bilaterally, with  the largest on the left measuring 6.4 cm. Differential considerations  include endometriomas and hemorrhagic cysts. Tubo-ovarian abscess is  considered less likely from this appearance. Consider pelvic MRI for  further evaluation.  2. Otherwise unremarkable pelvic ultrasound with Doppler. No  convincing sonographic evidence for ovarian torsion.    HUONG ELIZONDO,  MD    Results for orders placed during the hospital encounter of 05/05/21   CT Abdomen Pelvis w Contrast    Narrative CT ABDOMEN AND PELVIS WITH CONTRAST  5/5/2021 12:45 PM    CLINICAL HISTORY: Abdominal pain, acute, nonlocalized.    TECHNIQUE: CT scan of the abdomen and pelvis was performed following  injection of IV contrast. Multiplanar reformats were obtained. Dose  reduction techniques were used.  CONTRAST: 39 mL Isovue 370    COMPARISON: None.    FINDINGS:   LOWER CHEST: Normal.    HEPATOBILIARY: Dependent sludge is seen within the gallbladder.    PANCREAS: Normal.    SPLEEN: Normal.    ADRENAL GLANDS: Normal.    KIDNEYS/BLADDER: Mild right-sided hydronephrosis and hydroureter is  noted with a caliber change in the adnexal region where there are  multiple cysts on the right ovary. This may be causing compression of  the ureter. No evidence for stone. The left ureter is not dilated.    BOWEL: No evidence for bowel obstruction. No colonic wall thickening  or inflammatory change. Normal appendix.    PELVIC ORGANS: Small amount of free fluid is present within the pelvis  and the right paracolic gutter. Multiple cysts are seen on the right  ovary. There is a low-attenuation lesion in the left adnexal region  measuring 4.0 x 3.4 cm that may represent a hemorrhagic cyst. In the  appropriate clinical setting, a tubo-ovarian abscess would be a  possibility as well.    ADDITIONAL FINDINGS: None.    MUSCULOSKELETAL: Normal.      Impression IMPRESSION:   1.  Multiple cysts are seen on both ovaries. One on the left ovary  measures up to 4.0 cm and is soft tissue density. This may represent a  hemorrhagic cyst. In the appropriate clinical setting, tubo-ovarian  abscess would be in the differential as well.  2.  Small amount of free fluid within the pelvis and right paracolic  gutter. This may be due to recent cyst rupture.  3.  Mild right-sided hydronephrosis and hydroureter down to the level  of the ovarian cysts which may  be compressing the ureter. No evidence  for stone.    LANDON AGUIRRE MD         Assessment:  Rosita Corrigan is a 40 year old  pre-menopausal female with bilateral complex ovarian cysts with elevated  concerning for probable endometriomas but with concern for malignancy.    Plan:   1. We discussed the differential for adnexal masses, which included benign, borderline, and malignant conditions. We reviewed that we cannot know for certain which a mass is without surgery to remove the mass; however, we can use patient factors such as age and family history, imaging, and lab results to guide our differential.  2. We discussed limitations of  in pre-menopausal women. Based upon the appearance of the masses and the level of elevation of CA-125 I think these are most likely endometriomas based upon their appearance on my review of her ultrasound images. However, again, we cannot know for certain without pathology. In her case the following treatment options were discussed: observation (briefly only as an option), ovarian suppression, and surgical resection. I would favor surgical exploration and resection  3. If these are in fact endometriomas, options would include ovarian cystectomy with subsequent ovarian suppression (with oral cottraceptive pills or other combined hormonal option or Depo Provera. Without suppression, there is the risk of recurrence of cysts. Even with suppression there is a risk, albeit lower, of recurrence. Definitive treatment is bilateral salpingo-oophorectomy, but this comes with risks of pre-mature surgical menopause, which are only partially mitigated with use of exogenous hormone replacement.  4. Given she does not desire future fertility, I think Diagnostic laparoscopy with probable unilateral oophorectomy to remove the most abnormal ovary and contralateral ovarian cystectomy is a good balance of risks/benefits. I will send her ovary for frozen section and proceed with  staging if indicated. Staging includes bilateral removal of ovaries as well as hysterectomy and removal of lymph nodes. Given her negative CT scan and normal BMI, I think laparoscopic staging is reasonable if indicated and feasible. Otherwise conversion to open would be required.   5. We did discuss the possibility of scar tissue and need for open surgery if this is endometriosis. If that is the case, I would recommend removal of uterus and bilateral ovaries.   6. She would like to proceed with Diagnostic laparoscopy, probable removal of left ovary (largest and most abnormal by ultrasound, but will plan to remove the most abnormal appearing), with cystectomy for the contralateral ovary. She is aware of the possibility of bilateral removal and implications. Also aware of fertility impllications.   7. I will confirm desire to remove bilateral fallopian tubes day of surgery, which would result in sterilization even if ovary is retained, but which may confer decreased risk of ovarian cancer without increase in risk of the procedure or affect on hormones.    I spent a total of 60 minutes on care related to Rosiat Corrigan's visit today including a total of 45 minutes in direct video visit with patient and her , with the remainder of the time spent in chart review and charting.    Abdi Valero MD    Division of Gynecologic Oncology  Department of Ob/Gyn and Women's Health  [facility]

## 2021-05-17 ENCOUNTER — PRE VISIT (OUTPATIENT)
Dept: ONCOLOGY | Facility: CLINIC | Age: 41
End: 2021-05-17

## 2021-05-17 ENCOUNTER — VIRTUAL VISIT (OUTPATIENT)
Dept: ONCOLOGY | Facility: CLINIC | Age: 41
End: 2021-05-17
Attending: OBSTETRICS & GYNECOLOGY
Payer: COMMERCIAL

## 2021-05-17 ENCOUNTER — TELEPHONE (OUTPATIENT)
Dept: ONCOLOGY | Facility: CLINIC | Age: 41
End: 2021-05-17

## 2021-05-17 DIAGNOSIS — N83.291 COMPLEX CYST OF BOTH OVARIES: Primary | ICD-10-CM

## 2021-05-17 DIAGNOSIS — N83.292 COMPLEX CYST OF BOTH OVARIES: Primary | ICD-10-CM

## 2021-05-17 DIAGNOSIS — Z11.59 ENCOUNTER FOR SCREENING FOR OTHER VIRAL DISEASES: ICD-10-CM

## 2021-05-17 DIAGNOSIS — R97.1 ELEVATED CA-125: ICD-10-CM

## 2021-05-17 LAB
COPATH REPORT: NORMAL
PAP: NORMAL

## 2021-05-17 PROCEDURE — 99205 OFFICE O/P NEW HI 60 MIN: CPT | Mod: 95 | Performed by: OBSTETRICS & GYNECOLOGY

## 2021-05-17 RX ORDER — HEPARIN SODIUM 10000 [USP'U]/ML
5000 INJECTION, SOLUTION INTRAVENOUS; SUBCUTANEOUS
Status: CANCELLED | OUTPATIENT
Start: 2021-05-17

## 2021-05-17 NOTE — TELEPHONE ENCOUNTER
Surgery is scheduled with Dr. Valero on 5/28 at [place].  Scheduled per patient.    H&P: to be completed by Surgeon    Additional appointments:   NO ADDITIONAL APPOINTMENTS NEEDED AT THIS TIME    COVID-19 test: 5/26 at [facility]     Post-op: will be scheduled by the clinic.     The RN completed the education regarding the surgery.     Patient will receive a phone call from pre-admission nurses 1-2 days prior to surgery with arrival and start time.    The surgery packet was sent via US mail. and sent via netTALK.    Patient will complete COVID-19 test that was scheduled by surgical coordinator 2-4 days prior to surgery.     I called the patient and was able to confirm the scheduled information above.

## 2021-05-17 NOTE — TELEPHONE ENCOUNTER
[facility] + sent [facility] to schedule surgery with Dr. Abdi Valero.

## 2021-05-17 NOTE — LETTER
2021         RE: Rosita Corrigan  [address] [facility] [address]        Dear Colleague,    Thank you for referring your patient, Rosita Corrigan, to the [facility]. Please see a copy of my visit note below.    Gynecologic Oncology Clinic - New Patient Virtual Visit    Patient is being seen as a virtual visit. Consent has been obtained and documented in chart.    Referring provider:    Luz Elena Carvalho MD  [address] [place] [address]    Patient: Rosita Corrigan  : 1980    Date of Visit: May 17, 2021     Chief Complaint: complex adnexal cysts    History of Present Illness:  Rosita Corrigan is a 40 year old  pre-menopausal female who presents with complex adnexal cysts.     She initially presented to ED on 2021 (per ED notes review) with abdominal pain. TVUS and CT showed complex adnexal cysts possibly consistent with hemorrhagic cysts.  elevated to 136 on 2021.    She reports the severe pain has improved. She has noted increasingly heavy painful menses. She continues to have regular periods. She had discussed having surgery with primary gynecologist, but due to elevated  presents to our clinic.          Review of Systems:  +fatigue, change in appetite, night sweats, chills, constipation, pelvic pain, all others negative      OB/Gynecologic History:  ,  x 2. She has 2 daughters. Youngest in 9 years old. Partner has vasectomy.   She has history of OCP use for about 10 years.  Regular menses, every month every 28-30 days. Last 6 days. She has increasingly painful. Not heavy.   Last Pap Smear: 2021: In process. History of abnormal: No    Past Medical History  Past Medical History:   Diagnosis Date     Gestational diabetes       contractions        Past Surgical History  Past Surgical History:   Procedure Laterality Date     MOUTH SURGERY      wisdom teeth       Social History  Social History  "    Tobacco Use     Smoking status: Never Smoker     Smokeless tobacco: Never Used   Substance Use Topics     Alcohol use: Yes     Comment: occassional     Drug use: No    She lives with her  and two daughters. She works as a .    Family History  Patient is adopted, unknown family history.    No current outpatient medications on file.        Allergies  Allergies   Allergen Reactions     Omeprazole Other (See Comments) and Difficulty breathing     dizzy       Physical Exam:   Ht 1.397 m (4' 7\")   Wt 37.2 kg (82 lb)   LMP 04/23/2021 (Exact Date)   BMI 19.06 kg/m    General appearance: Alert and oriented, no acute distress, well groomed  Virtual visit    Pathology:   n/a    Labs:  Lab Results   Component Value Date     136 (H) 05/13/2021       Imaging:   CT Abdomen Pelvis w Contrast  Narrative: CT ABDOMEN AND PELVIS WITH CONTRAST  5/5/2021 12:45 PM    CLINICAL HISTORY: Abdominal pain, acute, nonlocalized.    TECHNIQUE: CT scan of the abdomen and pelvis was performed following  injection of IV contrast. Multiplanar reformats were obtained. Dose  reduction techniques were used.  CONTRAST: 39 mL Isovue 370    COMPARISON: None.    FINDINGS:   LOWER CHEST: Normal.    HEPATOBILIARY: Dependent sludge is seen within the gallbladder.    PANCREAS: Normal.    SPLEEN: Normal.    ADRENAL GLANDS: Normal.    KIDNEYS/BLADDER: Mild right-sided hydronephrosis and hydroureter is  noted with a caliber change in the adnexal region where there are  multiple cysts on the right ovary. This may be causing compression of  the ureter. No evidence for stone. The left ureter is not dilated.    BOWEL: No evidence for bowel obstruction. No colonic wall thickening  or inflammatory change. Normal appendix.    PELVIC ORGANS: Small amount of free fluid is present within the pelvis  and the right paracolic gutter. Multiple cysts are seen on the right  ovary. There is a low-attenuation lesion in the left adnexal " region  measuring 4.0 x 3.4 cm that may represent a hemorrhagic cyst. In the  appropriate clinical setting, a tubo-ovarian abscess would be a  possibility as well.    ADDITIONAL FINDINGS: None.    MUSCULOSKELETAL: Normal.  Impression: IMPRESSION:   1.  Multiple cysts are seen on both ovaries. One on the left ovary  measures up to 4.0 cm and is soft tissue density. This may represent a  hemorrhagic cyst. In the appropriate clinical setting, tubo-ovarian  abscess would be in the differential as well.  2.  Small amount of free fluid within the pelvis and right paracolic  gutter. This may be due to recent cyst rupture.  3.  Mild right-sided hydronephrosis and hydroureter down to the level  of the ovarian cysts which may be compressing the ureter. No evidence  for stone.    LANDON AGUIRRE MD  US Pelvis Cmplt w Transvag & Doppler LmtPel Duplex Limited  Narrative: US PELVIS COMPLETE WITH TRANSVAGINAL AND DOPPLER LIMITED    5/5/2021  2:55 PM     HISTORY: Left lower quadrant pain.    TECHNIQUE: Transvaginal imaging was added to the transabdominal scans.  Spectral Doppler and wave form analysis was also performed to evaluate  blood flow to the ovaries.    COMPARISON: CT of the abdomen and pelvis performed earlier today.    FINDINGS: The uterus is measured at 9.3 x 3.6 x 4.4 cm. No fibroids  are evident. Endometrial stripe measures 1 cm and is within normal  limits for a premenopausal patient. The right ovary contains 2 complex  cystic lesions, with the largest measuring 2.6 cm. The left ovary  contains a complex cystic lesion measuring 6.4 cm. No solid adnexal  masses are identified. Trace amount of free pelvic fluid is present.  Spectral Doppler and waveform analysis demonstrate arterial and venous  flow to both ovaries.   Impression: IMPRESSION:   1. There are complex cystic lesions in the ovaries bilaterally, with  the largest on the left measuring 6.4 cm. Differential considerations  include endometriomas and  hemorrhagic cysts. Tubo-ovarian abscess is  considered less likely from this appearance. Consider pelvic MRI for  further evaluation.  2. Otherwise unremarkable pelvic ultrasound with Doppler. No  convincing sonographic evidence for ovarian torsion.    HUONG ELIZONDO MD    Results for orders placed during the hospital encounter of 05/05/21   CT Abdomen Pelvis w Contrast    Narrative CT ABDOMEN AND PELVIS WITH CONTRAST  5/5/2021 12:45 PM    CLINICAL HISTORY: Abdominal pain, acute, nonlocalized.    TECHNIQUE: CT scan of the abdomen and pelvis was performed following  injection of IV contrast. Multiplanar reformats were obtained. Dose  reduction techniques were used.  CONTRAST: 39 mL Isovue 370    COMPARISON: None.    FINDINGS:   LOWER CHEST: Normal.    HEPATOBILIARY: Dependent sludge is seen within the gallbladder.    PANCREAS: Normal.    SPLEEN: Normal.    ADRENAL GLANDS: Normal.    KIDNEYS/BLADDER: Mild right-sided hydronephrosis and hydroureter is  noted with a caliber change in the adnexal region where there are  multiple cysts on the right ovary. This may be causing compression of  the ureter. No evidence for stone. The left ureter is not dilated.    BOWEL: No evidence for bowel obstruction. No colonic wall thickening  or inflammatory change. Normal appendix.    PELVIC ORGANS: Small amount of free fluid is present within the pelvis  and the right paracolic gutter. Multiple cysts are seen on the right  ovary. There is a low-attenuation lesion in the left adnexal region  measuring 4.0 x 3.4 cm that may represent a hemorrhagic cyst. In the  appropriate clinical setting, a tubo-ovarian abscess would be a  possibility as well.    ADDITIONAL FINDINGS: None.    MUSCULOSKELETAL: Normal.      Impression IMPRESSION:   1.  Multiple cysts are seen on both ovaries. One on the left ovary  measures up to 4.0 cm and is soft tissue density. This may represent a  hemorrhagic cyst. In the appropriate clinical setting,  tubo-ovarian  abscess would be in the differential as well.  2.  Small amount of free fluid within the pelvis and right paracolic  gutter. This may be due to recent cyst rupture.  3.  Mild right-sided hydronephrosis and hydroureter down to the level  of the ovarian cysts which may be compressing the ureter. No evidence  for stone.    LANDON AGUIRRE MD         Assessment:  Rosita Corrigan is a 40 year old  pre-menopausal female with bilateral complex ovarian cysts with elevated  concerning for probable endometriomas but with concern for malignancy.    Plan:   1. We discussed the differential for adnexal masses, which included benign, borderline, and malignant conditions. We reviewed that we cannot know for certain which a mass is without surgery to remove the mass; however, we can use patient factors such as age and family history, imaging, and lab results to guide our differential.  2. We discussed limitations of  in pre-menopausal women. Based upon the appearance of the masses and the level of elevation of CA-125 I think these are most likely endometriomas based upon their appearance on my review of her ultrasound images. However, again, we cannot know for certain without pathology. In her case the following treatment options were discussed: observation (briefly only as an option), ovarian suppression, and surgical resection. I would favor surgical exploration and resection  3. If these are in fact endometriomas, options would include ovarian cystectomy with subsequent ovarian suppression (with oral cottraceptive pills or other combined hormonal option or Depo Provera. Without suppression, there is the risk of recurrence of cysts. Even with suppression there is a risk, albeit lower, of recurrence. Definitive treatment is bilateral salpingo-oophorectomy, but this comes with risks of pre-mature surgical menopause, which are only partially mitigated with use of exogenous hormone  replacement.  4. Given she does not desire future fertility, I think Diagnostic laparoscopy with probable unilateral oophorectomy to remove the most abnormal ovary and contralateral ovarian cystectomy is a good balance of risks/benefits. I will send her ovary for frozen section and proceed with staging if indicated. Staging includes bilateral removal of ovaries as well as hysterectomy and removal of lymph nodes. Given her negative CT scan and normal BMI, I think laparoscopic staging is reasonable if indicated and feasible. Otherwise conversion to open would be required.   5. We did discuss the possibility of scar tissue and need for open surgery if this is endometriosis. If that is the case, I would recommend removal of uterus and bilateral ovaries.   6. She would like to proceed with Diagnostic laparoscopy, probable removal of left ovary (largest and most abnormal by ultrasound, but will plan to remove the most abnormal appearing), with cystectomy for the contralateral ovary. She is aware of the possibility of bilateral removal and implications. Also aware of fertility impllications.   7. I will confirm desire to remove bilateral fallopian tubes day of surgery, which would result in sterilization even if ovary is retained, but which may confer decreased risk of ovarian cancer without increase in risk of the procedure or affect on hormones.    I spent a total of 60 minutes on care related to Rosita Corrigan's visit today including a total of 45 minutes in direct video visit with patient and her , with the remainder of the time spent in chart review and charting.    Abdi Valero MD    Division of Gynecologic Oncology  Department of Ob/Gyn and Women's Health  [facility]       Again, thank you for allowing me to participate in the care of your patient.        Sincerely,        Abdi Valero MD

## 2021-05-18 ENCOUNTER — PATIENT OUTREACH (OUTPATIENT)
Dept: ONCOLOGY | Facility: CLINIC | Age: 41
End: 2021-05-18

## 2021-05-18 NOTE — PROGRESS NOTES
[facility]:  Patient Education Note    Relevant Diagnosis:  Complex cyst of both ovaries; Elevated  level    Procedure/Surgery Type:  Laparoscopic right ovarian cystectomy, possible right salpingo-oophorectomy Left salpingo-oophorectomy, possible left ovarian cystectomy; possible total laparoscopic hysterectomy, possible bilateral salpingo-oophorectomy, possible lymphadenectomy and cancer staging surgery    Procedure/Surgery Date: 05/28/2021    Person(s) involved in teaching:  Patient, via telephone.  Surgery packet was sent to patient via Schedulize and mail yesterday.    Motivation Level:    Asks Questions:   Yes  Eager to Learn:  Yes  Cooperative:  Yes  Receptive (willing/able to accept information):  Yes  Comments:  N/A    Patient demonstrates understanding of the following:  Reason for the appointment, diagnosis and treatment plan:  Yes  Knowledge of proper use of medications and conditions for which they are ordered (with special attention to potential side effects or drug interactions):  Yes  Which situations necessitate calling provider and whom to contact:  Yes    Teaching Concerns:  No    Education/Instructional Materials Used/Given:     Before Your Surgery Booklet ([facility] [place])  Showering Before Surgery, CHG prep provided  Adult Pain Assessment Tool  Hysterectomy Guidelines   Home Care After Gynecologic Surgery  [facility] ([place])  Accommodations Brochure   Phone numbers for [facility] and After-Hours Line provided to patient    Pre-op tests:     COVID-19 Testing: Ordered; appointment scheduled for 05/26/2021    Other: N/A    Pre-op appointment: Dr. Valero will complete pre-op H&P exam    Post-op appointment: Monday, 06/14/2021, at 3:00 pm (video visit with Dr. Valero)    Time spent teaching with patient:  20 minutes    Per MD, patient will sign surgery consents the day of surgery.    Jeremy De Leon, RN, BSN, OCN  Oncology Care Coordinator  [facility]  [facility]

## 2021-05-19 LAB
FINAL DIAGNOSIS: NORMAL
HPV HR 12 DNA CVX QL NAA+PROBE: NEGATIVE
HPV16 DNA SPEC QL NAA+PROBE: NEGATIVE
HPV18 DNA SPEC QL NAA+PROBE: NEGATIVE
SPECIMEN DESCRIPTION: NORMAL
SPECIMEN SOURCE CVX/VAG CYTO: NORMAL

## 2021-05-26 DIAGNOSIS — Z11.59 ENCOUNTER FOR SCREENING FOR OTHER VIRAL DISEASES: ICD-10-CM

## 2021-05-26 LAB
LABORATORY COMMENT REPORT: NORMAL
SARS-COV-2 RNA RESP QL NAA+PROBE: NEGATIVE
SARS-COV-2 RNA RESP QL NAA+PROBE: NORMAL
SPECIMEN SOURCE: NORMAL
SPECIMEN SOURCE: NORMAL

## 2021-05-26 PROCEDURE — U0005 INFEC AGEN DETEC AMPLI PROBE: HCPCS | Performed by: OBSTETRICS & GYNECOLOGY

## 2021-05-26 PROCEDURE — U0003 INFECTIOUS AGENT DETECTION BY NUCLEIC ACID (DNA OR RNA); SEVERE ACUTE RESPIRATORY SYNDROME CORONAVIRUS 2 (SARS-COV-2) (CORONAVIRUS DISEASE [COVID-19]), AMPLIFIED PROBE TECHNIQUE, MAKING USE OF HIGH THROUGHPUT TECHNOLOGIES AS DESCRIBED BY CMS-2020-01-R: HCPCS | Performed by: OBSTETRICS & GYNECOLOGY

## 2021-05-27 ENCOUNTER — ANESTHESIA EVENT (OUTPATIENT)
Dept: SURGERY | Facility: CLINIC | Age: 41
End: 2021-05-27
Payer: COMMERCIAL

## 2021-05-28 ENCOUNTER — HOSPITAL ENCOUNTER (OUTPATIENT)
Facility: CLINIC | Age: 41
Discharge: HOME | End: 2021-05-28
Attending: OBSTETRICS & GYNECOLOGY | Admitting: OBSTETRICS & GYNECOLOGY
Payer: COMMERCIAL

## 2021-05-28 ENCOUNTER — ANESTHESIA (OUTPATIENT)
Dept: SURGERY | Facility: CLINIC | Age: 41
End: 2021-05-28
Payer: COMMERCIAL

## 2021-05-28 VITALS
SYSTOLIC BLOOD PRESSURE: 137 MMHG | TEMPERATURE: 97.7 F | HEIGHT: 55 IN | RESPIRATION RATE: 11 BRPM | BODY MASS INDEX: 18.93 KG/M2 | WEIGHT: 81.79 LBS | DIASTOLIC BLOOD PRESSURE: 82 MMHG | OXYGEN SATURATION: 100 % | HEART RATE: 92 BPM

## 2021-05-28 DIAGNOSIS — Z98.890 S/P LAPAROSCOPY: Primary | ICD-10-CM

## 2021-05-28 DIAGNOSIS — R97.1 ELEVATED CA-125: ICD-10-CM

## 2021-05-28 DIAGNOSIS — N83.291 COMPLEX CYST OF BOTH OVARIES: ICD-10-CM

## 2021-05-28 DIAGNOSIS — E89.40 SURGICAL MENOPAUSE: ICD-10-CM

## 2021-05-28 DIAGNOSIS — N83.292 COMPLEX CYST OF BOTH OVARIES: ICD-10-CM

## 2021-05-28 LAB — GLUCOSE BLDC GLUCOMTR-MCNC: 82 MG/DL (ref 70–99)

## 2021-05-28 PROCEDURE — 250N000009 HC RX 250: Performed by: NURSE ANESTHETIST, CERTIFIED REGISTERED

## 2021-05-28 PROCEDURE — 88305 TISSUE EXAM BY PATHOLOGIST: CPT | Mod: 26 | Performed by: PATHOLOGY

## 2021-05-28 PROCEDURE — 250N000011 HC RX IP 250 OP 636: Performed by: OBSTETRICS & GYNECOLOGY

## 2021-05-28 PROCEDURE — 250N000006 HC OR RX SURGIFLO W/THROMBIN KIT 2ML 1991 OPNP: Performed by: OBSTETRICS & GYNECOLOGY

## 2021-05-28 PROCEDURE — 258N000003 HC RX IP 258 OP 636: Performed by: ANESTHESIOLOGY

## 2021-05-28 PROCEDURE — 250N000011 HC RX IP 250 OP 636: Performed by: NURSE ANESTHETIST, CERTIFIED REGISTERED

## 2021-05-28 PROCEDURE — 370N000017 HC ANESTHESIA TECHNICAL FEE, PER MIN: Performed by: OBSTETRICS & GYNECOLOGY

## 2021-05-28 PROCEDURE — 250N000011 HC RX IP 250 OP 636: Performed by: ANESTHESIOLOGY

## 2021-05-28 PROCEDURE — 82962 GLUCOSE BLOOD TEST: CPT

## 2021-05-28 PROCEDURE — 88305 TISSUE EXAM BY PATHOLOGIST: CPT | Mod: TC | Performed by: OBSTETRICS & GYNECOLOGY

## 2021-05-28 PROCEDURE — 710N000012 HC RECOVERY PHASE 2, PER MINUTE: Performed by: OBSTETRICS & GYNECOLOGY

## 2021-05-28 PROCEDURE — 710N000010 HC RECOVERY PHASE 1, LEVEL 2, PER MIN: Performed by: OBSTETRICS & GYNECOLOGY

## 2021-05-28 PROCEDURE — 88112 CYTOPATH CELL ENHANCE TECH: CPT | Mod: 26 | Performed by: PATHOLOGY

## 2021-05-28 PROCEDURE — 88331 PATH CONSLTJ SURG 1 BLK 1SPC: CPT | Mod: TC | Performed by: OBSTETRICS & GYNECOLOGY

## 2021-05-28 PROCEDURE — 258N000003 HC RX IP 258 OP 636: Performed by: NURSE ANESTHETIST, CERTIFIED REGISTERED

## 2021-05-28 PROCEDURE — 88331 PATH CONSLTJ SURG 1 BLK 1SPC: CPT | Mod: 26 | Performed by: PATHOLOGY

## 2021-05-28 PROCEDURE — 88112 CYTOPATH CELL ENHANCE TECH: CPT | Mod: TC | Performed by: OBSTETRICS & GYNECOLOGY

## 2021-05-28 PROCEDURE — 88307 TISSUE EXAM BY PATHOLOGIST: CPT | Mod: TC | Performed by: OBSTETRICS & GYNECOLOGY

## 2021-05-28 PROCEDURE — 999N001018 HC STATISTIC H-CELL BLOCK W/STAIN: Performed by: OBSTETRICS & GYNECOLOGY

## 2021-05-28 PROCEDURE — 999N000141 HC STATISTIC PRE-PROCEDURE NURSING ASSESSMENT: Performed by: OBSTETRICS & GYNECOLOGY

## 2021-05-28 PROCEDURE — 88307 TISSUE EXAM BY PATHOLOGIST: CPT | Mod: 26 | Performed by: PATHOLOGY

## 2021-05-28 PROCEDURE — 250N000025 HC SEVOFLURANE, PER MIN: Performed by: OBSTETRICS & GYNECOLOGY

## 2021-05-28 PROCEDURE — 360N000077 HC SURGERY LEVEL 4, PER MIN: Performed by: OBSTETRICS & GYNECOLOGY

## 2021-05-28 PROCEDURE — 250N000013 HC RX MED GY IP 250 OP 250 PS 637: Performed by: STUDENT IN AN ORGANIZED HEALTH CARE EDUCATION/TRAINING PROGRAM

## 2021-05-28 PROCEDURE — 272N000001 HC OR GENERAL SUPPLY STERILE: Performed by: OBSTETRICS & GYNECOLOGY

## 2021-05-28 RX ORDER — NORETHINDRONE ACETATE AND ETHINYL ESTRADIOL 1MG-20(21)
1 KIT ORAL DAILY
Qty: 84 TABLET | Refills: 3 | Status: SHIPPED | OUTPATIENT
Start: 2021-05-28 | End: 2021-05-28

## 2021-05-28 RX ORDER — HYDROMORPHONE HYDROCHLORIDE 1 MG/ML
.3-.5 INJECTION, SOLUTION INTRAMUSCULAR; INTRAVENOUS; SUBCUTANEOUS EVERY 10 MIN PRN
Status: DISCONTINUED | OUTPATIENT
Start: 2021-05-28 | End: 2021-05-28 | Stop reason: HOSPADM

## 2021-05-28 RX ORDER — PROPOFOL 10 MG/ML
INJECTION, EMULSION INTRAVENOUS PRN
Status: DISCONTINUED | OUTPATIENT
Start: 2021-05-28 | End: 2021-05-28

## 2021-05-28 RX ORDER — ONDANSETRON 2 MG/ML
INJECTION INTRAMUSCULAR; INTRAVENOUS PRN
Status: DISCONTINUED | OUTPATIENT
Start: 2021-05-28 | End: 2021-05-28

## 2021-05-28 RX ORDER — NALOXONE HYDROCHLORIDE 0.4 MG/ML
0.2 INJECTION, SOLUTION INTRAMUSCULAR; INTRAVENOUS; SUBCUTANEOUS
Status: DISCONTINUED | OUTPATIENT
Start: 2021-05-28 | End: 2021-05-28 | Stop reason: HOSPADM

## 2021-05-28 RX ORDER — OXYCODONE HYDROCHLORIDE 5 MG/1
5 TABLET ORAL EVERY 6 HOURS PRN
Qty: 6 TABLET | Refills: 0 | Status: SHIPPED | OUTPATIENT
Start: 2021-05-28 | End: 2021-09-27

## 2021-05-28 RX ORDER — SODIUM CHLORIDE, SODIUM LACTATE, POTASSIUM CHLORIDE, CALCIUM CHLORIDE 600; 310; 30; 20 MG/100ML; MG/100ML; MG/100ML; MG/100ML
INJECTION, SOLUTION INTRAVENOUS CONTINUOUS
Status: DISCONTINUED | OUTPATIENT
Start: 2021-05-28 | End: 2021-05-28 | Stop reason: HOSPADM

## 2021-05-28 RX ORDER — ONDANSETRON 4 MG/1
4 TABLET, ORALLY DISINTEGRATING ORAL EVERY 30 MIN PRN
Status: DISCONTINUED | OUTPATIENT
Start: 2021-05-28 | End: 2021-05-28 | Stop reason: HOSPADM

## 2021-05-28 RX ORDER — HEPARIN SODIUM 5000 [USP'U]/.5ML
5000 INJECTION, SOLUTION INTRAVENOUS; SUBCUTANEOUS
Status: COMPLETED | OUTPATIENT
Start: 2021-05-28 | End: 2021-05-28

## 2021-05-28 RX ORDER — BUPIVACAINE HYDROCHLORIDE 2.5 MG/ML
INJECTION, SOLUTION INFILTRATION; PERINEURAL PRN
Status: DISCONTINUED | OUTPATIENT
Start: 2021-05-28 | End: 2021-05-28 | Stop reason: HOSPADM

## 2021-05-28 RX ORDER — ACETAMINOPHEN 325 MG/1
650 TABLET ORAL EVERY 6 HOURS
Qty: 24 TABLET | Refills: 0 | Status: SHIPPED | OUTPATIENT
Start: 2021-05-28 | End: 2021-09-27

## 2021-05-28 RX ORDER — MEPERIDINE HYDROCHLORIDE 25 MG/ML
12.5 INJECTION INTRAMUSCULAR; INTRAVENOUS; SUBCUTANEOUS
Status: DISCONTINUED | OUTPATIENT
Start: 2021-05-28 | End: 2021-05-28 | Stop reason: HOSPADM

## 2021-05-28 RX ORDER — LIDOCAINE HYDROCHLORIDE 20 MG/ML
INJECTION, SOLUTION INFILTRATION; PERINEURAL PRN
Status: DISCONTINUED | OUTPATIENT
Start: 2021-05-28 | End: 2021-05-28

## 2021-05-28 RX ORDER — NALOXONE HYDROCHLORIDE 0.4 MG/ML
0.4 INJECTION, SOLUTION INTRAMUSCULAR; INTRAVENOUS; SUBCUTANEOUS
Status: DISCONTINUED | OUTPATIENT
Start: 2021-05-28 | End: 2021-05-28 | Stop reason: HOSPADM

## 2021-05-28 RX ORDER — FENTANYL CITRATE 50 UG/ML
25-50 INJECTION, SOLUTION INTRAMUSCULAR; INTRAVENOUS
Status: DISCONTINUED | OUTPATIENT
Start: 2021-05-28 | End: 2021-05-28 | Stop reason: HOSPADM

## 2021-05-28 RX ORDER — ONDANSETRON 2 MG/ML
4 INJECTION INTRAMUSCULAR; INTRAVENOUS EVERY 30 MIN PRN
Status: DISCONTINUED | OUTPATIENT
Start: 2021-05-28 | End: 2021-05-28 | Stop reason: HOSPADM

## 2021-05-28 RX ORDER — IBUPROFEN 200 MG
800 TABLET ORAL ONCE
Status: DISCONTINUED | OUTPATIENT
Start: 2021-05-28 | End: 2021-05-28 | Stop reason: HOSPADM

## 2021-05-28 RX ORDER — ACETAMINOPHEN 325 MG/1
975 TABLET ORAL ONCE
Status: COMPLETED | OUTPATIENT
Start: 2021-05-28 | End: 2021-05-28

## 2021-05-28 RX ORDER — IBUPROFEN 600 MG/1
600 TABLET, FILM COATED ORAL EVERY 6 HOURS
Qty: 12 TABLET | Refills: 0 | Status: SHIPPED | OUTPATIENT
Start: 2021-05-28 | End: 2021-09-27

## 2021-05-28 RX ORDER — CEFAZOLIN SODIUM 2 G/100ML
2 INJECTION, SOLUTION INTRAVENOUS
Status: COMPLETED | OUTPATIENT
Start: 2021-05-28 | End: 2021-05-28

## 2021-05-28 RX ORDER — CEFAZOLIN SODIUM 2 G/100ML
2 INJECTION, SOLUTION INTRAVENOUS SEE ADMIN INSTRUCTIONS
Status: DISCONTINUED | OUTPATIENT
Start: 2021-05-28 | End: 2021-05-28 | Stop reason: HOSPADM

## 2021-05-28 RX ORDER — FENTANYL CITRATE 50 UG/ML
INJECTION, SOLUTION INTRAMUSCULAR; INTRAVENOUS PRN
Status: DISCONTINUED | OUTPATIENT
Start: 2021-05-28 | End: 2021-05-28

## 2021-05-28 RX ORDER — ONDANSETRON 4 MG/1
4-8 TABLET, ORALLY DISINTEGRATING ORAL EVERY 8 HOURS PRN
Qty: 4 TABLET | Refills: 0 | Status: SHIPPED | OUTPATIENT
Start: 2021-05-28 | End: 2021-06-02

## 2021-05-28 RX ORDER — OXYCODONE HYDROCHLORIDE 5 MG/1
5 TABLET ORAL
Status: DISCONTINUED | OUTPATIENT
Start: 2021-05-28 | End: 2021-05-28 | Stop reason: HOSPADM

## 2021-05-28 RX ORDER — SODIUM CHLORIDE, SODIUM LACTATE, POTASSIUM CHLORIDE, CALCIUM CHLORIDE 600; 310; 30; 20 MG/100ML; MG/100ML; MG/100ML; MG/100ML
INJECTION, SOLUTION INTRAVENOUS CONTINUOUS PRN
Status: DISCONTINUED | OUTPATIENT
Start: 2021-05-28 | End: 2021-05-28

## 2021-05-28 RX ORDER — SENNA AND DOCUSATE SODIUM 50; 8.6 MG/1; MG/1
2 TABLET, FILM COATED ORAL 2 TIMES DAILY
Qty: 60 TABLET | Refills: 0 | Status: SHIPPED | OUTPATIENT
Start: 2021-05-28 | End: 2021-09-27

## 2021-05-28 RX ORDER — NORETHINDRONE ACETATE AND ETHINYL ESTRADIOL 1MG-20(21)
1 KIT ORAL DAILY
Qty: 84 TABLET | Refills: 3 | Status: SHIPPED | OUTPATIENT
Start: 2021-05-28 | End: 2021-06-05 | Stop reason: ALTCHOICE

## 2021-05-28 RX ADMIN — SUGAMMADEX 100 MG: 100 INJECTION, SOLUTION INTRAVENOUS at 15:38

## 2021-05-28 RX ADMIN — PROCHLORPERAZINE EDISYLATE 5 MG: 5 INJECTION INTRAMUSCULAR; INTRAVENOUS at 17:49

## 2021-05-28 RX ADMIN — CEFAZOLIN 2 G: 10 INJECTION, POWDER, FOR SOLUTION INTRAVENOUS at 13:44

## 2021-05-28 RX ADMIN — HYDROMORPHONE HYDROCHLORIDE 0.3 MG: 1 INJECTION, SOLUTION INTRAMUSCULAR; INTRAVENOUS; SUBCUTANEOUS at 16:24

## 2021-05-28 RX ADMIN — ONDANSETRON 4 MG: 2 INJECTION INTRAMUSCULAR; INTRAVENOUS at 17:03

## 2021-05-28 RX ADMIN — SODIUM CHLORIDE, POTASSIUM CHLORIDE, SODIUM LACTATE AND CALCIUM CHLORIDE: 600; 310; 30; 20 INJECTION, SOLUTION INTRAVENOUS at 16:13

## 2021-05-28 RX ADMIN — LIDOCAINE HYDROCHLORIDE 80 MG: 20 INJECTION, SOLUTION INFILTRATION; PERINEURAL at 13:26

## 2021-05-28 RX ADMIN — MIDAZOLAM 1 MG: 1 INJECTION INTRAMUSCULAR; INTRAVENOUS at 13:24

## 2021-05-28 RX ADMIN — PROPOFOL 100 MG: 10 INJECTION, EMULSION INTRAVENOUS at 13:26

## 2021-05-28 RX ADMIN — ONDANSETRON 4 MG: 2 INJECTION INTRAMUSCULAR; INTRAVENOUS at 15:15

## 2021-05-28 RX ADMIN — ROCURONIUM BROMIDE 40 MG: 10 INJECTION INTRAVENOUS at 13:26

## 2021-05-28 RX ADMIN — ROCURONIUM BROMIDE 10 MG: 10 INJECTION INTRAVENOUS at 14:24

## 2021-05-28 RX ADMIN — HYDROMORPHONE HYDROCHLORIDE 0.2 MG: 1 INJECTION, SOLUTION INTRAMUSCULAR; INTRAVENOUS; SUBCUTANEOUS at 16:34

## 2021-05-28 RX ADMIN — FENTANYL CITRATE 50 MCG: 50 INJECTION, SOLUTION INTRAMUSCULAR; INTRAVENOUS at 13:24

## 2021-05-28 RX ADMIN — HYDROMORPHONE HYDROCHLORIDE 0.5 MG: 1 INJECTION, SOLUTION INTRAMUSCULAR; INTRAVENOUS; SUBCUTANEOUS at 15:13

## 2021-05-28 RX ADMIN — ACETAMINOPHEN 975 MG: 325 TABLET, FILM COATED ORAL at 16:23

## 2021-05-28 RX ADMIN — FENTANYL CITRATE 50 MCG: 50 INJECTION, SOLUTION INTRAMUSCULAR; INTRAVENOUS at 13:57

## 2021-05-28 RX ADMIN — SODIUM CHLORIDE, POTASSIUM CHLORIDE, SODIUM LACTATE AND CALCIUM CHLORIDE: 600; 310; 30; 20 INJECTION, SOLUTION INTRAVENOUS at 13:24

## 2021-05-28 RX ADMIN — HEPARIN SODIUM 5000 UNITS: 5000 INJECTION, SOLUTION INTRAVENOUS; SUBCUTANEOUS at 11:47

## 2021-05-28 ASSESSMENT — MIFFLIN-ST. JEOR: SCORE: 878.13

## 2021-05-28 NOTE — PROGRESS NOTES
Gynecologic Oncology Postoperative Check Note  5/28/2021    S: Rosita is doing ok. Still in PACU, waiting on Phase II bed. Pain is improving. Tried some water and cookies but then threw it up. Has not yet ambulated/voided.    O:  Vitals:    05/28/21 1615 05/28/21 1700 05/28/21 1715 05/28/21 1730   BP: 125/89 124/85 118/87 111/79   BP Location:       Cuff Size:       Pulse: 88 98 82 80   Resp: 15 10 19 18   Temp:       TempSrc:       SpO2: 100% 99% 97% 98%   Weight:       Height:             Gen: alert and oriented, resting in bed  Cardio: regular rate  Resp: normal respiratory effort  Abdomen: soft, appropriately tender to palpation  Incision: dressing in place over umbilical incision, clean and dry    I/O last 3 completed shifts:  In: 350 [I.V.:350]  Out: -     A: 41 year old POD#0 s/p Lsc BSO. Reviewed events of surgery and plan for hormone replacement therapy with COCPs.    Dz: complex adnexal cysts with chocolate fluid, consistent with endometriomas. Frozen pathology benign.  FEN: ADAT.  Pain: scheduled tylenol/ibuprofen, prn oxycodone.  Heme:  Hgb 12.2>EBL 10  CV: NI  Pulm: NI, Encourage IS use.   GI: nausea and vomiting, will monitor and ADAT, prn antiemetics and bowel regimen   : s/p rebolledo, awaiting void. Surgical menopause, discharging with OCPs, plan for continuous use.  ID: Afebrile,    Endo: NI  Psych/Neuro: NI  PPX: SCDs  Dispo: Discharge to home pending PO tolerance and void.    Katy Bar MD  OB/GYN PGY-4  5/28/2021 6:02 PM  Gyn Onc pgr [phone]

## 2021-05-28 NOTE — ANESTHESIA POSTPROCEDURE EVALUATION
Patient: Rosita Corrigan    Procedure(s):  Single incision laparoscopic lysis of adhesions >30 min, bilateral salpingo-oophorectomy  Cystoscopy     Diagnosis:Complex cyst of both ovaries [N83.291, N83.292]  Elevated CA-125 [R97.1]  Diagnosis Additional Information: No value filed.    Anesthesia Type:  General    Note:  Disposition: Admission   Postop Pain Control: Uneventful            Sign Out: Well controlled pain   PONV: No   Neuro/Psych: Uneventful            Sign Out: Acceptable/Baseline neuro status   Airway/Respiratory: Uneventful            Sign Out: Acceptable/Baseline resp. status   CV/Hemodynamics: Uneventful            Sign Out: Acceptable CV status; No obvious hypovolemia; No obvious fluid overload   Other NRE: NONE   DID A NON-ROUTINE EVENT OCCUR? No           Last vitals:  Vitals:    05/28/21 1110 05/28/21 1600   BP: (!) 121/94 129/89   Pulse: 104 92   Resp: 16 16   Temp: 36.8  C (98.2  F) 36.6  C (97.9  F)   SpO2: 100% 100%       Last vitals prior to Anesthesia Care Transfer:  CRNA VITALS  5/28/2021 1528 - 5/28/2021 1616      5/28/2021             NIBP:  129/89    Ht Rate:  92    SpO2:  100 %    EKG:  Sinus rhythm          Electronically Signed By: Bhakti Spears MD  May 28, 2021  4:16 PM

## 2021-05-28 NOTE — OP NOTE
[facility]   Operative Note     Pre-operative diagnosis:         Complex cyst of both ovaries [N83.291, N83.292]  Elevated CA-125 [R97.1]  Post-operative diagnosis        Same as pre-operative diagnosis     Procedure:      Procedure(s):  Single incision laparoscopic bilateral salpingo-oophorectomy  Lysis of adhesions (30 min)   Cystoscopy       Surgeon:         Surgeon(s) and Role:     * Abdi Valero MD - Primary     * Katy Bar MD - Resident - Assisting     * Luzmaria Starks MD - Fellow - Assisting    Anesthesia:     General               Estimated blood loss:  10 ml     Drains:  None    Specimens:       ID Type Source Tests Collected by Time Destination   1 : Pelvic washings Washings Pelvis CYTOLOGY NON GYN Lito Hull RN 5/28/2021  2:10 PM     A :  Tissue Fallopian Tube and Ovary, Right SURGICAL PATHOLOGY EXAM Abdi Valero MD 5/28/2021  2:35 PM     B : LEFT Ovary and Fallopian Tube Tissue Fallopian Tube and Ovary, Left SURGICAL PATHOLOGY EXAM Abdi Valero MD 5/28/2021  3:06 PM        Findings:  On exam under anesthesia, normal appearing external genitalia, cervix without lesions and palpably normal. Vagina without nodularity. Rectum without nodularity/masses. Bimanual revealed fixed right adnexal mass. On laparoscopy, liver surface with significant Tpxw-Sjmo-Ibbefv adhesions, diaphragm, stomach edge. Omentum, large and small bowel unremarkable. Peritoneum without implants. On abdominal entry, no evidence of injury. In the pelvis, the uterus is slightly enlarged but still normal sized. There are clear implants across the serosa consistent with endometriosis. The fallopian tubes and ovaries are densely adherent to the pelvic sidewalls and the posterior uterus. A loop of small bowel was adherent to the ovarian fossa. The right ovary was enlarged to 12 cm, during dissection it ruptured and drained chocolate cyst fluid. The right ovary was densely  adherent to the right pelvic sidewall/ovarian fossa and posterior cul de sac. There were filmy adhesions of the bowel to the right IP ligament. The left ovary was grossly abnormal, normal size but multicystic consistent with endometriosis with drainage of dark brown blood. There was minimal to no normal ovarian tissue identified.  Pelvis consistent with endometriosis, adhesions of the posterior cul de sac and sigmoid colon tacked up to the posterior cervix, endometriotic implants along ovarian fossae bilaterally and along the uterosacral ligaments.   Frozen section: benign endometrioma of the right ovary.  Before and following procedure, bilateral ureters were easily visualized to vermiculate. Cystoscopy revealed normal bladder epithelium without injury or defect, robust bilateral ureteral efflux. Hemostasis visualized, Surgiflo placed overlying dissection spaces particularly along the left uterine artery where there was slight oozing from dissection.      Complications:            None.      Indications: Rosita Corrigan is a 42 yo  with bilateral ovarian cysts and elevated  concerning for probable endometriomas but some concern for malignancy. Surgical removal was recommended. As she is premenopausal but does not desire future fertility, plan was made to likely remove the most abnormal ovary with possible cystectomy on the contralateral ovary; however, she was consented for possible bilateral salpingo-oophorectomy pending intra-operative findings.     Description of Procedure:   The patient was taken to the operating room where she was prepped and draped in the usual sterile fashion. She then underwent GETA, and was positioned to the dorsal lithotomy position with yellow-fin stirrups. A time out was completed.    A rebolledo catheter was placed in the bladder. A medium graves speculum was placed in the vagina, and the anterior lip of the cervix was grasped with a single-toothed tenaculum. A medium VCare uterine  manipulator was placed.    Attention was then turned to the umbilicus, which was everted with Allis clamps. A 2 cm incision was made in the base of the umbilicus. The subcutaneous tissue was dissected bluntly. The fascia was identified, grasped with two Kocher clamps, and incised with a scalpel. It was extended, and it was noted that peritoneal entry had been obtained. The ring for the gel port was placed. The gel port was placed, and the laparoscope was inserted. CO2 gas was connected, and the abdomen was insufflated. Abdominal survey revealed the above findings. The patient was placed in steep Trendelenburg and bowel was retracted from pelvic with atraumatic grasper. Pelvic washings were obtained. The retroperitoneum was opened on the right. The right ureter was identified.  Adhesions from the small bowel to the right ovary and cyst were taken down with sharp dissection. The right gonadal vessels were cauterized and ligated with the Ligasure device. The underlying mesovarium was then cauterized to separate the ovary from the broad ligament. The cyst was adherent to the posterior uterus, requiring careful lysis of adhesions both bluntly and with the Ligasure device.The uteroovarian ligament was cauterized and transected. Rupture was inevitable due to the dense adhesions to the uterus. It drained dark brown fluid consistent with endometrioma. The specimen was placed in a 10 mm Endocatch bag and removed. It was sent for frozen pathology. Attention was turned to the left tube and ovary. After the ovary was freed enough to be fully visualized, it became apparent that the left ovary was replaced by endometriomas. I phoned the patient's  from the OR to discuss removal of the ovary due to concern for probable recurrence of the endometrioma if cystectomy was performed. Her  concurred with the plan to remove the remaining ovary. Decision was made to proceed with bilateral salping-oophorectomy at this point.  The posterior leaf of the left broad ligament was incised, and the retroperitoneum was accessed. The areolar tissue was dissected bluntly, and the ureter was identified in the retroperitoneal space. The left ovary was grasped and elevated. The gonadal vessels were cauterized and ligated, and the underlying mesovarium was serially cauterized and transected. The ovary was adherent to the lateral uterus, and was dissected away bluntly. The uteroovarian ligament was cauterized, and transected, and the specimen was removed and sent for routine pathology. The pelvis was irrigated, and the left lateral uterus was noted to be oozing from the previous adhesions near the cornua and an area near the uterine arteries. This was controlled with the Ligasure. Due to continued oozing, Surgiflo was placed in this area to aid in hemostasis. The pneumoperitoneum was released during cystoscopy.     The rebolledo catheter was removed. Cystoscopy was then performed and revealed brisk efflux from bilateral ureteral ostia and no evidence of injury to the bladder.     The pneumoperitoneum was re-established. On final inspection of the pelvis, adequate hemostasis was noted. The gel port was removed, and the abdomen was desufflated.    The fascia was closed with two running sutures of 0 Vicryl. The skin was closed with 4-0 Monocryl and covered with a pressure dressing.     There were no complications during the procedure. All counts were correct prior to closure. The patient was awakened and taken to recovery in stable condition.     Abdi Valero MD    Gynecologic Oncology

## 2021-05-28 NOTE — ANESTHESIA PROCEDURE NOTES
Airway       Patient location during procedure: OR  Staff -        CRNA: Lin Martin APRN CRNA       Performed By: CRNAIndications and Patient Condition       Indications for airway management: grady-procedural       Induction type:intravenous       Mask difficulty assessment: 1 - vent by mask    Final Airway Details       Final airway type: endotracheal airway       Successful airway: ETT - single  Endotracheal Airway Details        ETT size (mm): 6.5       Cuffed: yes       Successful intubation technique: direct laryngoscopy       DL Blade Type: MAC 3       Grade View of Cords: 1       Adjucts: stylet       Position: Right       Measured from: lips       Secured at (cm): 20       Bite block used: None    Post intubation assessment        Placement verified by: capnometry, equal breath sounds and chest rise        Number of attempts at approach: 1       Secured with: pink tape       Ease of procedure: easy       Dentition: Intact    Medication(s) Administered   Medication Administration Time: 5/28/2021 1:29 PM

## 2021-05-28 NOTE — BRIEF OP NOTE
[facility]    Brief Operative Note    Pre-operative diagnosis: Complex cyst of both ovaries [N83.291, N83.292]  Elevated CA-125 [R97.1]  Post-operative diagnosis Same as pre-operative diagnosis    Procedure: Procedure(s):  Single incision laparoscopic bilateral salpingo-oophorectomy  Lysis of adhesions (30 min)   Cystoscopy   Surgeon: Surgeon(s) and Role:     * Abdi Valero MD - Primary     * Katy Bar MD - Resident - Assisting     * Luzmaria Starks MD - Fellow - Assisting  Anesthesia: General   Estimated blood loss: 10 ml   Drains:  None  Specimens:   ID Type Source Tests Collected by Time Destination   1 : Pelvic washings Washings Pelvis CYTOLOGY NON GYN Lito Hull RN 5/28/2021  2:10 PM    A :  Tissue Fallopian Tube and Ovary, Right SURGICAL PATHOLOGY EXAM Abdi Valero MD 5/28/2021  2:35 PM    B : LEFT Ovary and Fallopian Tube Tissue Fallopian Tube and Ovary, Left SURGICAL PATHOLOGY EXAM Abdi Valero MD 5/28/2021  3:06 PM      Findings:  On exam under anesthesia, normal appearing external genitalia, cervix without lesions and palpably normal. Vagina without nodularity. Rectum without nodularity/masses. Bimanual revealed fixed right adnexal mass. On laparoscopy, normal appearing liver surface, diaphragm, stomach edge. Omentum, large and small bowel unremarkable. Peritoneum without implants. On abdominal entry, no evidence of injury. In the pelvis, normal size uterus without nodules/lesions, fallopian tubes were torsed and densely adherent to the ovaries. The right ovary was enlarged to 12 cm, smooth cystic in appearance and drained chocolate cyst fluid. The right ovary was densely adherent to the right pelvic sidewall/ovarian fossa and posterior cul de sac. There were filmy adhesions of the bowel to the right IP ligament. The left ovary was grossly abnormal, normal size but multicystic consistent with endometriosis. Pelvis consistent with  clinical endometriosis, adhesions of the posterior cul de sac and sigmoid colon tacked up to the posterior cervix, endometriotic implants along ovarian fossae bilaterally and along the uterosacral ligaments. Frozen section: benign endometrioma of the right ovary.  Before and following procedure, bilateral ureters were easily visualized to vermiculate. Cystoscopy revealed normal bladder epithelium without injury or defect, robust bilateral ureteral efflux. Hemostasis visualized, Surgiflo placed overlying dissection spaces.     Complications: None.  Implants: * No implants in log *

## 2021-05-28 NOTE — ANESTHESIA PREPROCEDURE EVALUATION
Anesthesia Pre-Procedure Evaluation    Patient: Rosita Corrigan   MRN: [id number] : 1980        Preoperative Diagnosis: Complex cyst of both ovaries [N83.291, N83.292]  Elevated CA-125 [R97.1]   Procedure : Procedure(s):  Laparoscopic right ovarian cystectomy, possible right salpingo-oophorectomy, Left salpingo-oophorectomy, possible left ovarian cystectomy  Possible total laparoscopic hysterectomy, bilateral salpingo-oophorectomy, lymphadenectomy and cancer staging surgery     Past Medical History:   Diagnosis Date     Gestational diabetes       contractions       Past Surgical History:   Procedure Laterality Date     COLONOSCOPY       MOUTH SURGERY      wisdom teeth      Allergies   Allergen Reactions     Omeprazole Other (See Comments) and Difficulty breathing     dizzy     Ranitidine Dizziness      Social History     Tobacco Use     Smoking status: Never Smoker     Smokeless tobacco: Never Used   Substance Use Topics     Alcohol use: Yes     Comment: occassional      Wt Readings from Last 1 Encounters:   21 37.1 kg (81 lb 12.7 oz)        Anesthesia Evaluation            ROS/MED HX  ENT/Pulmonary:       Neurologic:       Cardiovascular:       METS/Exercise Tolerance:     Hematologic:       Musculoskeletal:       GI/Hepatic:       Renal/Genitourinary:       Endo:       Psychiatric/Substance Use:     (+) psychiatric history anxiety     Infectious Disease:       Malignancy:       Other:            Physical Exam    Airway        Mallampati: I       Respiratory Devices and Support         Dental           Cardiovascular          Rhythm and rate: regular     Pulmonary           breath sounds clear to auscultation           OUTSIDE LABS:  CBC:   Lab Results   Component Value Date    WBC 9.8 2021    WBC 5.8 2015    HGB 12.2 2021    HGB 13.3 2015    HCT 37.2 2021    HCT 39.8 2015     2021     2015     BMP:   Lab Results   Component Value  Date     05/05/2021     07/22/2015    POTASSIUM 4.0 05/05/2021    POTASSIUM 3.8 07/22/2015    CHLORIDE 103 05/05/2021    CHLORIDE 105 07/22/2015    CO2 26 05/05/2021    CO2 25 07/22/2015    BUN 11 05/05/2021    BUN 12 07/22/2015    CR 0.60 05/05/2021    CR 0.67 07/22/2015     (H) 05/05/2021    GLC 84 11/09/2018     COAGS: No results found for: PTT, INR, FIBR  POC:   Lab Results   Component Value Date    BGM 82 05/28/2021    HCG Negative 07/22/2015    HCGS Negative 05/05/2021     HEPATIC:   Lab Results   Component Value Date    ALBUMIN 3.8 05/05/2021    PROTTOTAL 8.0 05/05/2021    ALT 16 05/05/2021    AST 19 05/05/2021    ALKPHOS 78 05/05/2021    BILITOTAL 0.7 05/05/2021     OTHER:   Lab Results   Component Value Date    LACT 1.1 05/05/2021    A1C 5.4 11/09/2018    JERICHO 8.7 05/05/2021    MAG 2.4 (H) 07/22/2015    LIPASE 50 (L) 05/05/2021    TSH 0.49 09/13/2016       Anesthesia Plan    ASA Status:  1      Anesthesia Type: General.     - Airway: ETT   Induction: Intravenous.   Maintenance: Balanced.   Techniques and Equipment:     - Lines/Monitors: 2nd IV     Consents    Anesthesia Plan(s) and associated risks, benefits, and realistic alternatives discussed. Questions answered and patient/representative(s) expressed understanding.     - Discussed with:  Patient      - Extended Intubation/Ventilatory Support Discussed: No.      - Patient is DNR/DNI Status: No    Use of blood products discussed: Yes.     - Discussed with: Patient.     - Consented: consented to blood products            Reason for refusal: other.     Postoperative Care       PONV prophylaxis: Ondansetron (or other 5HT-3), Dexamethasone or Solumedrol     Comments:                Thee Hernandez MD

## 2021-05-28 NOTE — ANESTHESIA CARE TRANSFER NOTE
Patient: Rosita Corrigan    Procedure(s):  Single incision laparoscopic lysis of adhesions >30 min, bilateral salpingo-oophorectomy  Cystoscopy     Diagnosis: Complex cyst of both ovaries [N83.291, N83.292]  Elevated CA-125 [R97.1]  Diagnosis Additional Information: No value filed.    Anesthesia Type:   General     Note:    Oropharynx: oropharynx clear of all foreign objects  Level of Consciousness: awake  Oxygen Supplementation: nasal cannula  Level of Supplemental Oxygen (L/min / FiO2): 2  Independent Airway: airway patency satisfactory and stable  Dentition: dentition unchanged  Vital Signs Stable: post-procedure vital signs reviewed and stable  Report to RN Given: handoff report given  Patient transferred to: PACU    Handoff Report: Identifed the Patient, Identified the Reponsible Provider, Reviewed the pertinent medical history, Discussed the surgical course, Reviewed Intra-OP anesthesia mangement and issues during anesthesia, Set expectations for post-procedure period and Allowed opportunity for questions and acknowledgement of understanding      Vitals: (Last set prior to Anesthesia Care Transfer)  CRNA VITALS  5/28/2021 1528 - 5/28/2021 1606      5/28/2021             Pulse:  96    Ht Rate:  98    SpO2:  100 %    Resp Rate (observed):  (!) 1        Electronically Signed By: ROSE Mann CRNA  May 28, 2021  4:06 PM

## 2021-05-28 NOTE — DISCHARGE INSTRUCTIONS
Start birth control right away and skip sugar pills.   [facility]   Adult Discharge Orders & Instructions   For 24 hours after surgery    1. Get plenty of rest.  A responsible adult must stay with you for at least 24 hours after you leave the hospital.   2. Do not drive or use heavy equipment.  If you have weakness or tingling, don't drive or use heavy equipment until this feeling goes away.  3. Do not drink alcohol.  4. Avoid strenuous or risky activities.  Ask for help when climbing stairs.   5. You may feel lightheaded.  IF so, sit for a few minutes before standing.  Have someone help you get up.   6. If you have nausea (feel sick to your stomach): Drink only clear liquids such as apple juice, ginger ale, broth or 7-Up.  Rest may also help.  Be sure to drink enough fluids.  Move to a regular diet as you feel able.  7. You may have a slight fever. Call the doctor if your fever is over 100 F (37.7 C) (taken under the tongue) or lasts longer than 24 hours.  8. You may have a dry mouth, a sore throat, muscle aches or trouble sleeping.  These should go away after 24 hours.  9. Do not make important or legal decisions.   Call your doctor for any of the followin.  Signs of infection (fever, growing tenderness at the surgery site, a large amount of drainage or bleeding, severe pain, foul-smelling drainage, redness, swelling).    2. It has been over 8 to 10 hours since surgery and you are still not able to urinate (pass water).    3.  Headache for over 24 hours.    To contact a doctor, call Dr. Abdi Valero at the [facility]/ OB -GYN clinic at [phone] or:     X [phone] and ask for the resident on call for OB/GYN (answered 24 hours a day)   X Emergency Department: [facility]: [phone]       (TTY for hearing impaired: [phone])

## 2021-05-29 NOTE — PROGRESS NOTES
"[facility]  Post Op Check Note    S: Patient feeling fine. Pain well controlled. Tolerating small amounts of PO intake. Voiding spontaneously. No dizziness. Denies fevers, chills, SOB, chest pain, nausea, emesis.      O:  Patient Vitals for the past 24 hrs:   BP Temp Temp src Pulse Resp SpO2 Height Weight   05/28/21 1911 137/82 97.7  F (36.5  C) Oral 92 11 100 % -- --   05/28/21 1730 111/79 -- -- 80 18 98 % -- --   05/28/21 1715 118/87 -- -- 82 19 97 % -- --   05/28/21 1700 124/85 -- -- 98 10 99 % -- --   05/28/21 1615 125/89 -- -- 88 15 100 % -- --   05/28/21 1600 129/89 97.9  F (36.6  C) Temporal 92 16 100 % -- --   05/28/21 1110 (!) 121/94 98.2  F (36.8  C) Oral 104 16 100 % 1.397 m (4' 7\") 37.1 kg (81 lb 12.7 oz)     Gen: NAD  CV: RR  Resp: non-labored breathing on room air  Abd: soft, non-distended, appropriately tender  Inc: C/D/I dressing overlying single incision laparoscopy port site  Ext: no edema    A/P: Rosita Corrigan is a 41 year old POD#0 s/p MICHAEL BSO for bilateral complex adnexal masses and elevated CA-125 c/w endometriomas, benign on frozen. Recovering appropriately in the immediate post-op period.    # post-operative state  - Patient meeting post-operative goals and is appropriate for discharge home at this time. Pain controlled with oral pain medications, voiding spontaneously, ambulating without difficulty or dizziness. Discussed post-op/discharge instructions. All questions were answered. Agreeable to discharge home with family.   - Follow up with Dr. Valero on 6/14  - Rx for Tylenol, ibuprofen, oxycodone, Zofran, Senna-S, and Junel provided    Francis Maldonado MD  Ob/Gyn Resident, PGY-2  05/28/21 8:38 PM        "

## 2021-06-01 ENCOUNTER — PATIENT OUTREACH (OUTPATIENT)
Dept: ONCOLOGY | Facility: CLINIC | Age: 41
End: 2021-06-01

## 2021-06-01 DIAGNOSIS — N83.291 COMPLEX CYST OF BOTH OVARIES: ICD-10-CM

## 2021-06-01 DIAGNOSIS — Z98.890 S/P LAPAROSCOPY: ICD-10-CM

## 2021-06-01 DIAGNOSIS — N83.292 COMPLEX CYST OF BOTH OVARIES: ICD-10-CM

## 2021-06-01 NOTE — PROGRESS NOTES
[facility]:  Care Coordination Note    Telephone call was attempted to reach patient this afternoon, to follow-up on how she is doing after her surgery last week.  Patient did not answer at the time of writer's call.  Voicemail message was left on patient's phone, encouraging her to return call at her earliest convenience.  Direct phone number for this RN was provided in message to patient.     Jeremy De Leon, RN, BSN, OCN  Oncology Care Coordinator  [facility]

## 2021-06-02 DIAGNOSIS — Z98.890 S/P LAPAROSCOPY: ICD-10-CM

## 2021-06-02 DIAGNOSIS — N83.292 COMPLEX CYST OF BOTH OVARIES: ICD-10-CM

## 2021-06-02 DIAGNOSIS — N83.291 COMPLEX CYST OF BOTH OVARIES: ICD-10-CM

## 2021-06-02 LAB — COPATH REPORT: NORMAL

## 2021-06-02 RX ORDER — ONDANSETRON 4 MG/1
4-8 TABLET, ORALLY DISINTEGRATING ORAL EVERY 8 HOURS PRN
Qty: 20 TABLET | Refills: 0 | Status: CANCELLED | OUTPATIENT
Start: 2021-06-02

## 2021-06-02 RX ORDER — ONDANSETRON 4 MG/1
4-8 TABLET, ORALLY DISINTEGRATING ORAL EVERY 8 HOURS PRN
Qty: 20 TABLET | Refills: 0 | Status: SHIPPED | OUTPATIENT
Start: 2021-06-02 | End: 2021-09-27

## 2021-06-02 NOTE — TELEPHONE ENCOUNTER
SUBJECTIVE/OBJECTIVE:                                                    Refill request received for:  ondansetron (ZOFRAN-ODT) 4 MG ODT tab    Last refill: 05/28/2021 for quantity 4 tablets  Date of LOV r/t Medication: 05/28/2021 (surgery)  Future appt scheduled? Yes: 06/14/2021     RECENT LABS/VITALS                                                      Lab Results   Component Value Date    AST 19 05/05/2021     Lab Results   Component Value Date    ALT 16 05/05/2021     Creatinine   Date Value Ref Range Status   05/05/2021 0.60 0.52 - 1.04 mg/dL Final   ]  Potassium   Date Value Ref Range Status   05/05/2021 4.0 3.4 - 5.3 mmol/L Final     BP Readings from Last 4 Encounters:   05/28/21 137/82   05/13/21 114/89   05/05/21 109/79   11/09/18 102/68       PLAN:                                                      Sent to provider to advise.

## 2021-06-02 NOTE — PROGRESS NOTES
Post-Discharge Phone Call    Surgery Date:  05/28/2021    Description of Surgery:  Single incision laparoscopic bilateral salpingo-oophorectomy; Lysis of adhesions (30 min); Cystoscopy.     Pain:  1) Location: Varies, sometimes in lower abdomen, sometimes in upper abdomen.  Eating makes the discomfort in her upper abdomen worse.    2) Rate pain on scale 1-10: 5/10 currently  3) Is your pain well controlled on your pain medication?: Somewhat - see below.  4) How often are you taking your pain medication?: Alternating Tylenol with ibuprofen every 6-8 hours, but states she has not taken anything so far today.  She took one dose of oxycodone but ended up vomiting shortly after (this happened on Sunday or Monday).  She is worried to take any more oxycodone as not to upset her stomach.  Encouraged patient to continue alternating Tylenol with ibuprofen, and also reviewed using ice PRN to see if this helps.    GI:  5) Last bowel movement: This morning  6) Are you having regular bowel movements?: Patient states she took 2 of the Senna-S pills on Monday night, and then again last night.  She ended up having about 3-4 loose stools during the night.  Initially it was large amounts and quite loose, but toward the end it was smaller amounts and seemed to become more solid per patient.  She also noticed a couple of small bowel movements this morning.  She is passing gas without difficulty.  Advised patient to hold off on taking any further Senna-S for now.  7) Eating/drinking well?: Appetite is not great, but she is eating in small amounts.  She states food does not appeal to her right now.  She is drinking water without difficulty.  Reviewed with patient that she may have to experiment with foods that appeal to her, and encouraged bland/light foods to start with and to avoid anything spicy, fatty, or greasy.  8) Nausea?: Yes.  She vomited once after taking an oxycodone pill, but no further vomiting since.  She continues to feel  nauseous, but feels that the Zofran does help.  She would like a refill of the Zofran, request was sent to Dr. Valero.      Urinary:  9) Are you having problems or difficulty with urination?: No.  Patient states she did have some discomfort with urination when she initially returned home from the hospital, but it got a little better each day and has now resolved.    Lower Extremities:  10) Were lymph nodes removed during surgery?: No  11) If yes, have you been offered a lymphedema consult appointment?: N/A  12) Any pain, redness, or swelling in legs?: No  13) Any area on your legs that are warm to touch?: No  14) Chest pain or severe shortness of breath?: Patient states that she feels some shortness of breath with activity, after she has been lying down for a long time.  No shortness of breath at rest or chest pain.  She feels tired, but has been trying to get up and walk around her house a little each day.    Wound:  15) Type of incision: Laparoscopic incision  Any of the following:     - Drainage (color, amount): None  - Odor: None  - Redness: Slight redness around incision but seems improved today, and not spreading.  - Chills: No  - Fever: No  16) Staples - Have you had your staples out yet? (staples should be removed 7-10 days post-op): N/A - no staples present   17) Pt was reminded to wash incision (allow warm, soapy water to run over incision): Yes    Post-op:  18) Verify date and time of appointment: Monday 06/14/2021 at 3:30 pm (video visit with Dr. Valero)  19) Pt was informed that pathology will be discussed at this appointment: Yes, and informed patient that it is likely her pathology results will automatically be released to her [facility] portal as well.    Any other questions or concerns at this time?: Yes - patient has questions pertaining to the dose of her current birth control pill.  Per patient, in the past she has always been on very low-dose birth control.  She asks if her current birth control  pill dose needs to be so high, if this could possibly be decreased - and, if Dr. Valero feels this could be contributing to her nausea or appetite issues?  Patient also wonders if Dr. Valero would recommend anything else to help with her appetite and/or nausea symptoms.      Advised patient a note with her questions and concerns will be sent to Dr. Valero, will plan to get back to patient with a response.    Jeremy De Leon, RN, BSN, OCN  Oncology Care Coordinator  [facility]

## 2021-06-03 LAB — COPATH REPORT: NORMAL

## 2021-06-05 DIAGNOSIS — E89.40 PREMATURE SURGICAL MENOPAUSE ON HRT: Primary | ICD-10-CM

## 2021-06-05 DIAGNOSIS — Z79.890 PREMATURE SURGICAL MENOPAUSE ON HRT: Primary | ICD-10-CM

## 2021-06-05 DIAGNOSIS — E89.40 SURGICAL MENOPAUSE: ICD-10-CM

## 2021-06-05 RX ORDER — NORETHINDRONE ACETATE AND ETHINYL ESTRADIOL, ETHINYL ESTRADIOL AND FERROUS FUMARATE 1MG-10(24)
1 KIT ORAL DAILY
Qty: 112 TABLET | Refills: 4 | Status: SHIPPED | OUTPATIENT
Start: 2021-06-05 | End: 2021-06-16 | Stop reason: ALTCHOICE

## 2021-06-05 RX ORDER — NORETHINDRONE ACETATE AND ETHINYL ESTRADIOL, ETHINYL ESTRADIOL AND FERROUS FUMARATE 1MG-10(24)
1 KIT ORAL DAILY
Qty: 84 TABLET | Refills: 4 | Status: SHIPPED | OUTPATIENT
Start: 2021-06-05 | End: 2021-06-05

## 2021-06-07 ENCOUNTER — NURSE TRIAGE (OUTPATIENT)
Dept: NURSING | Facility: CLINIC | Age: 41
End: 2021-06-07

## 2021-06-07 NOTE — TELEPHONE ENCOUNTER
I am not sure why this message was sent to  OB/GYN as pt has not seen any of our providers in the past.    Routing the the provider that recently prescribed BCP for pt.    Kori Teresa RN

## 2021-06-07 NOTE — TELEPHONE ENCOUNTER
"Triage Call: Pharmacist, Darrell, is calling stating that the LO Loestrin FE is too expensive for the patient and that the patient is requesting a new medication to be sent to her pharmacy.     Provider please advise.    Julissa Dunbar RN Nursing Advisor 6/7/2021 4:18 PM     Reason for Disposition    [1] Caller requesting a NON-URGENT new prescription or refill AND [2] triager unable to refill per unit policy    Additional Information    Negative: [1] DOUBLE DOSE (an extra dose or lesser amount) of prescription drug AND [2] NO symptoms (Exception: a double dose of antibiotics)    Negative: Diabetes drug error or overdose (e.g., took wrong type of insulin or took extra dose)    Negative: [1] Request for URGENT new prescription or refill of \"essential\" medication (i.e., likelihood of harm to patient if not taken) AND [2] triager unable to fill per unit policy    Negative: [1] Prescription not at pharmacy AND [2] was prescribed by PCP recently    Negative: [1] Pharmacy calling with prescription questions AND [2] triager unable to answer question    Negative: [1] Caller has URGENT medication question about med that PCP or specialist prescribed AND [2] triager unable to answer question    Negative: MORE THAN A DOUBLE DOSE of a prescription or over-the-counter (OTC) drug    Negative: [1] DOUBLE DOSE (an extra dose or lesser amount) of over-the-counter (OTC) drug AND [2] any symptoms (e.g., dizziness, nausea, pain, sleepiness)    Negative: [1] DOUBLE DOSE (an extra dose or lesser amount) of prescription drug AND [2] any symptoms (e.g., dizziness, nausea, pain, sleepiness)    Negative: Took another person's prescription drug    Negative: Drug overdose and triager unable to answer question    Negative: Caller requesting information unrelated to medicine    Negative: Caller requesting a prescription for Strep throat and has a positive culture result    Negative: Rash while taking a medication or within 3 days of stopping it    " Negative: Immunization reaction suspected    Negative: [1] Asthma and [2] having symptoms of asthma (cough, wheezing, etc.)    Negative: [1] Influenza symptoms AND [2] anti-viral med prescription request, such as Tamiflu    Negative: [1] Symptom of illness (e.g., headache, abdominal pain, earache, vomiting) AND [2] more than mild    Negative: [1] Caller has NON-URGENT medication question about med that PCP prescribed AND [2] triager unable to answer question    Protocols used: MEDICATION QUESTION CALL-A-AH

## 2021-06-08 ENCOUNTER — TELEPHONE (OUTPATIENT)
Dept: SURGERY | Facility: AMBULATORY SURGERY CENTER | Age: 41
End: 2021-06-08

## 2021-06-14 ENCOUNTER — VIRTUAL VISIT (OUTPATIENT)
Dept: ONCOLOGY | Facility: CLINIC | Age: 41
End: 2021-06-14
Payer: COMMERCIAL

## 2021-06-14 VITALS — HEIGHT: 55 IN | WEIGHT: 81 LBS | BODY MASS INDEX: 18.74 KG/M2

## 2021-06-14 DIAGNOSIS — N80.129 ENDOMETRIOMA OF OVARY: ICD-10-CM

## 2021-06-14 DIAGNOSIS — E89.40 PREMATURE SURGICAL MENOPAUSE ON HORMONE REPLACEMENT THERAPY: Primary | ICD-10-CM

## 2021-06-14 DIAGNOSIS — Z79.890 PREMATURE SURGICAL MENOPAUSE ON HORMONE REPLACEMENT THERAPY: Primary | ICD-10-CM

## 2021-06-14 PROCEDURE — 99212 OFFICE O/P EST SF 10 MIN: CPT | Mod: 95 | Performed by: OBSTETRICS & GYNECOLOGY

## 2021-06-14 RX ORDER — NORETHINDRONE ACETATE/ETHINYL ESTRADIOL 1MG-20MCG
KIT ORAL
COMMUNITY
Start: 2021-05-28 | End: 2021-06-16 | Stop reason: ALTCHOICE

## 2021-06-14 RX ORDER — NORETHINDRONE ACETATE/ETHINYL ESTRADIOL 1MG-20MCG
1 KIT ORAL DAILY
Status: CANCELLED | OUTPATIENT
Start: 2021-06-14

## 2021-06-14 ASSESSMENT — MIFFLIN-ST. JEOR: SCORE: 874.54

## 2021-06-14 NOTE — NURSING NOTE
Rosita is a 41 year old who is being evaluated via a billable video visit.      How would you like to obtain your AVS? MyChart  If the video visit is dropped, the invitation should be resent by: Text to cell phone: [phone]  Will anyone else be joining your video visit? No      Video-Visit Details    Type of service:  Video Visit      Originating Location (pt. Location): Home in [place]    Distant Location (provider location):  [facility]     Platform used for Video Visit: Phil     Concerns:  Still having cramping since surgery, pain scale this morning 5-6/10, Still bleeding.    Stephania Hair CMA

## 2021-06-14 NOTE — PROGRESS NOTES
"Gynecologic Oncology Clinic - Established Patient Visit    Visit date: Jun 14, 2021     CC: post-op    Interval history: Azalia Beaver is a 41 year old now iatrogenically post-menopausal female who underwent laparoscopic bilateral salpingo-oophorectomy for endometriomas    She is overall feeling much better. Struggled with pain and nausea initially but this is improving. She is taking combined OCPs for physiologic HRT. She is worried this may be contributing to her nausea, but lower dose estrogen pill is not covered by her insurance.     She does have hot flashes and night sweats.     She is eating and drinking well. In general she has no significant diarrhea/constipation or bladder concerns. Her pain is minimal at this point. Her incision is healing well.       Review of Systems:  As per HPI, otherwise non-contributory.     Past Surgical History:  Past Surgical History:   Procedure Laterality Date     COLONOSCOPY       CYSTOSCOPY N/A 5/28/2021    Procedure: Cystoscopy ;  Surgeon: Abdi Valero MD;  Location: [facility]     LAPAROSCOPIC SINGLE SITE SALPINGO-OOPHORECTOMY Bilateral 5/28/2021    Procedure: Single incision laparoscopic lysis of adhesions >30 min, bilateral salpingo-oophorectomy;  Surgeon: Abdi Valero MD;  Location: [facility] OR     MOUTH SURGERY      wisdom teeth        Physical Exam:  Ht 1.397 m (4' 7\")   Wt 36.7 kg (81 lb)   LMP 05/28/2021 (Exact Date)   BMI 18.83 kg/m     General appearance: no acute distress, well groomed, sitting comfortably   Virtual no further exam    Pathology:  Lab Results   Component Value Date    PATH  05/28/2021     Patient Name: AZALIA BEAVER  MR#: [id number]  Specimen #: [id number]  Collected: 5/28/2021  Received: 5/28/2021  Reported: 6/2/2021 16:53  Ordering Phy(s): ABDI VALERO    For improved result formatting, select 'View Enhanced Report Format' under   Linked Documents section.    ORIGINAL REPORT:    SPECIMEN(S):  A: Ovary and fallopian tube, right  B: Fallopian " "tube and ovary, left    FINAL DIAGNOSIS:  A. OVARY AND FALLOPIAN TUBE, RIGHT, SALPINGO-OOPHORECTOMY:  - Luteinized follicular cyst  - Acute salpingitis  - Negative for malignancy    B. OVARY AND FALLOPIAN TUBE, LEFT, SALPINGO-OOPHORECTOMY:  - Endometriotic and follicular cysts  - Acute salpingitis  - Negative for malignancy    COMMENT:  The final diagnosis confirms the interpretation provided intraoperatively.    I have personally reviewed all specimens and/or slides, including the   listed special stains, and used them  with my medical judgement to determine or confirm the final diagnosis.    Electronically signed out by:    Jana Schumacher M.D., PhD, [facility]    CLINICAL HISTORY:  41 year-old with bilateral complex ovary cysts.    GROSS:  A:  The specimen is received fresh with proper patient identification,   labeled \"fallopian tube and ovary,  right\".  The specimen consists of a 34.8 g, 5.4 x 3.4 x 2.9 cm disrupted   multicystic ovary with an attached  6.6 cm in length by 0.5 cm in diameter fimbriated patent fallopian tube.    No excrescences or solid areas are  identified on the cyst wall.  The cyst wall ranges from 0.3-0.5 cm in   thickness.  No unremarkable ovary is  identified.  Representative sections are submitted.    Summary of Sections:  A1FS - cyst wall for frozen section  A2 - fallopian tube  A3-A4 - additional cyst wall    B:  The specimen is received in formalin with proper patient   identification, labeled \"left ovary and fallopian  tube\".  The specimen consists of a 21.6 g, 4.0 x 3.0 x 2.5 cm ovary with   an attached 4.6 cm in length by 0.6  cm in diameter edematous fimbriated patent fallopian tube.  The ovary is   remarkable for three yellow-tan  unilocular cortical cysts ranging in size from 0.2-1.0 cm in greatest   dimension filled with yellow-tan serous  fluid.  No excrescences or solid areas are identified.  Representative   sections are submitted.    Summary of Sections:  B1 - fallopian " tube  B2-B3 - ovary with cysts (Dictated by: Ernin Ivan 6/1/2021 02:12 PM)    INTRAOPERATIVE CONSULTATION:  Frozen section is performed on part A. Please refer to Epic Result History   for the Preliminary Intraoperative  Diagnosis.    MICROSCOPIC:  Microscopic examination was performed.    The technical component of this testing was completed at the [facility], with the professional component performed   at the [facility], [address] [place] [phone] ([phone])    CPT Codes:  A: 05397-UD5, 06808-PH  B: 04630-MD1    COLLECTION SITE:  Client: [facility]  Location: [facility] ([facility])    Resident  GARRETT           Assessment:  Rosita Corrigan is a 41 year old now iatrogenically post-menopausal female who underwent laparoscopic bilateral salpingo-oophorectomy for endometriomas.    Plan:  - Hormone replacement therapy: hot flashes/night sweats raise concern for too low of estrogen dose, but we can wait another 1-2 months to see if they continue. We reviewed rationale for HRT and options including birth control pills, combined patch, combined HRT specific pills (which offer lower dosages), or combination of patch and oral progestins either 10-12 days monthly or continuous. LNG-IUD with transdermal estrogen is another option, but this is usually not covered by insurance for this purpose because it is not FDA approved in the [place] for this indication.   - She would like to continue on current combined oral contraceptive pills 1-20. I recommend 3 months to see what side effects remain and then re-evaluating. She was offered option of follow-up with me or returning to her general Ob/Gyn. She would prefer to be seen by her Ob/Gyn.   - She can resume pre-surgery activities. No need for pelvic rest. She can call with any surgery related concerns.   - She does still  have a cervix, so Pap screening should be continued per ASCCP guidelines.    We spent a total of 15 minutes in discussion today. 5 in regards to post-operative care and the remaining 10 was spent discussing HRT and options for management.     Abdi Valero MD    Gynecologic Oncology

## 2021-06-14 NOTE — LETTER
"    6/14/2021         RE: Azalia Beaver  [address] [address] [place] [id number]        Dear Colleague,    Thank you for referring your patient, Azalia Beaver, to the [facility]. Please see a copy of my visit note below.    Gynecologic Oncology Clinic - Established Patient Visit    Visit date: Jun 14, 2021     CC: post-op    Interval history: Azalia Beaver is a 41 year old now iatrogenically post-menopausal female who underwent laparoscopic bilateral salpingo-oophorectomy for endometriomas    She is overall feeling much better. Struggled with pain and nausea initially but this is improving. She is taking combined OCPs for physiologic HRT. She is worried this may be contributing to her nausea, but lower dose estrogen pill is not covered by her insurance.     She does have hot flashes and night sweats.     She is eating and drinking well. In general she has no significant diarrhea/constipation or bladder concerns. Her pain is minimal at this point. Her incision is healing well.       Review of Systems:  As per HPI, otherwise non-contributory.     Past Surgical History:  Past Surgical History:   Procedure Laterality Date     COLONOSCOPY       CYSTOSCOPY N/A 5/28/2021    Procedure: Cystoscopy ;  Surgeon: Abdi Valero MD;  Location: [facility]     LAPAROSCOPIC SINGLE SITE SALPINGO-OOPHORECTOMY Bilateral 5/28/2021    Procedure: Single incision laparoscopic lysis of adhesions >30 min, bilateral salpingo-oophorectomy;  Surgeon: Abdi Valero MD;  Location: [facility]     MOUTH SURGERY      wisdom teeth        Physical Exam:  Ht 1.397 m (4' 7\")   Wt 36.7 kg (81 lb)   LMP 05/28/2021 (Exact Date)   BMI 18.83 kg/m     General appearance: no acute distress, well groomed, sitting comfortably   Virtual no further exam    Pathology:  Lab Results   Component Value Date    PATH  05/28/2021     Patient Name: AZALIA BEAVER  MR#: [id number]  Specimen #: [id number]  Collected: 5/28/2021  Received: " "5/28/2021  Reported: 6/2/2021 16:53  Ordering Phy(s): TYREE WEBER    For improved result formatting, select 'View Enhanced Report Format' under   Linked Documents section.    ORIGINAL REPORT:    SPECIMEN(S):  A: Ovary and fallopian tube, right  B: Fallopian tube and ovary, left    FINAL DIAGNOSIS:  A. OVARY AND FALLOPIAN TUBE, RIGHT, SALPINGO-OOPHORECTOMY:  - Luteinized follicular cyst  - Acute salpingitis  - Negative for malignancy    B. OVARY AND FALLOPIAN TUBE, LEFT, SALPINGO-OOPHORECTOMY:  - Endometriotic and follicular cysts  - Acute salpingitis  - Negative for malignancy    COMMENT:  The final diagnosis confirms the interpretation provided intraoperatively.    I have personally reviewed all specimens and/or slides, including the   listed special stains, and used them  with my medical judgement to determine or confirm the final diagnosis.    Electronically signed out by:    Jana Schumacher M.D., PhD, [facility]    CLINICAL HISTORY:  41 year-old with bilateral complex ovary cysts.    GROSS:  A:  The specimen is received fresh with proper patient identification,   labeled \"fallopian tube and ovary,  right\".  The specimen consists of a 34.8 g, 5.4 x 3.4 x 2.9 cm disrupted   multicystic ovary with an attached  6.6 cm in length by 0.5 cm in diameter fimbriated patent fallopian tube.    No excrescences or solid areas are  identified on the cyst wall.  The cyst wall ranges from 0.3-0.5 cm in   thickness.  No unremarkable ovary is  identified.  Representative sections are submitted.    Summary of Sections:  A1FS - cyst wall for frozen section  A2 - fallopian tube  A3-A4 - additional cyst wall    B:  The specimen is received in formalin with proper patient   identification, labeled \"left ovary and fallopian  tube\".  The specimen consists of a 21.6 g, 4.0 x 3.0 x 2.5 cm ovary with   an attached 4.6 cm in length by 0.6  cm in diameter edematous fimbriated patent fallopian tube.  The ovary is   remarkable for three " yellow-tan  unilocular cortical cysts ranging in size from 0.2-1.0 cm in greatest   dimension filled with yellow-tan serous  fluid.  No excrescences or solid areas are identified.  Representative   sections are submitted.    Summary of Sections:  B1 - fallopian tube  B2-B3 - ovary with cysts (Dictated by: Erinn Ivan 6/1/2021 02:12 PM)    INTRAOPERATIVE CONSULTATION:  Frozen section is performed on part A. Please refer to Epic Result History   for the Preliminary Intraoperative  Diagnosis.    MICROSCOPIC:  Microscopic examination was performed.    The technical component of this testing was completed at the [facility], with the professional component performed   at the [facility], [address] [place] [phone] ([phone])    CPT Codes:  A: 33952-QK5, 20127-JG  B: 66893-WX9    COLLECTION SITE:  Client: [facility]  Location: [facility] ([facility])    Resident  GARRETT           Assessment:  Rosita Corrigan is a 41 year old now iatrogenically post-menopausal female who underwent laparoscopic bilateral salpingo-oophorectomy for endometriomas.    Plan:  - Hormone replacement therapy: hot flashes/night sweats raise concern for too low of estrogen dose, but we can wait another 1-2 months to see if they continue. We reviewed rationale for HRT and options including birth control pills, combined patch, combined HRT specific pills (which offer lower dosages), or combination of patch and oral progestins either 10-12 days monthly or continuous. LNG-IUD with transdermal estrogen is another option, but this is usually not covered by insurance for this purpose because it is not FDA approved in the [place] for this indication.   - She would like to continue on current combined oral contraceptive pills 1-20. I recommend 3 months to see what side effects remain and then  re-evaluating. She was offered option of follow-up with me or returning to her general Ob/Gyn. She would prefer to be seen by her Ob/Gyn.   - She can resume pre-surgery activities. No need for pelvic rest. She can call with any surgery related concerns.   - She does still have a cervix, so Pap screening should be continued per ASCCP guidelines.    We spent a total of 15 minutes in discussion today. 5 in regards to post-operative care and the remaining 10 was spent discussing HRT and options for management.     Abdi Valero MD    Gynecologic Oncology       Again, thank you for allowing me to participate in the care of your patient.        Sincerely,        Abdi Valero MD

## 2021-06-16 RX ORDER — NORETHINDRONE ACETATE AND ETHINYL ESTRADIOL .02; 1 MG/1; MG/1
TABLET ORAL
Qty: 112 TABLET | Refills: 4 | Status: SHIPPED | OUTPATIENT
Start: 2021-06-16 | End: 2021-09-27 | Stop reason: ALTCHOICE

## 2021-09-23 ENCOUNTER — TELEPHONE (OUTPATIENT)
Dept: ONCOLOGY | Facility: CLINIC | Age: 41
End: 2021-09-23

## 2021-09-23 NOTE — TELEPHONE ENCOUNTER
S: Pt calling to report continued bleeding since surgery in May, 2021.    B: Pt reports that she had surgery in May with Dr. Valero. Follow up visit with Dr. Valero in June. Pt had bilateral oopherectomy.     Bleeding has continued since the surgery. Has never gone away.   Bleeding occurs every day. Use 5-6 pads/day. Not super heavy but not super light either. Pt reported that she had bleeding when she saw Dr. Valero one month after surgery, but  thought that was pretty normal within that period of time.   Cramping--Has not had cramping until about the past two weeks.   Clots--has passed clots in the past but has had less clotting this past week.  Night sweats: Has about 3-4 episodes of night sweating per month since surgery; over the past week these have increased. Pt soaks pajamas.   Hot Flashes--has these occasionally during the day, but has started to have them more often over the past week.     Denies any medication changes.  No imaging done.   Called OB/GYN provider who thought pt should follow up with Dr. Valero's team    A: Spoke with Ameena from [facility]. Dr. Valero is in surgery currently. Christina Hernandez sees patients in  clinic, but she is not there today. Christina is not here today either.  Aren, RNCC recommended that pt schedule apt with Christina Hernandez tomorrow at [facility] because she has openings.     Called pt. She is unable to schedule for any time in either [place] or [facility] except for 9/30 at 2:10. Pt asked if the apt could be virtual, but RNCC advised an in person visit, and pt was agreeable.   Appointment held for patient. Will check with Christina Hernandez tomorrow to make sure this is okay.     R: Routed to Christina Hernandez and RNCC    RNCC from [facility] and  are following up on apt for patient.

## 2021-09-27 ENCOUNTER — VIRTUAL VISIT (OUTPATIENT)
Dept: ONCOLOGY | Facility: CLINIC | Age: 41
End: 2021-09-27
Payer: COMMERCIAL

## 2021-09-27 DIAGNOSIS — E89.40 PREMATURE SURGICAL MENOPAUSE ON HORMONE REPLACEMENT THERAPY: Primary | ICD-10-CM

## 2021-09-27 DIAGNOSIS — Z79.890 PREMATURE SURGICAL MENOPAUSE ON HORMONE REPLACEMENT THERAPY: Primary | ICD-10-CM

## 2021-09-27 PROCEDURE — 99214 OFFICE O/P EST MOD 30 MIN: CPT | Mod: 95 | Performed by: OBSTETRICS & GYNECOLOGY

## 2021-09-27 RX ORDER — PROGESTERONE 200 MG/1
CAPSULE ORAL
Qty: 30 CAPSULE | Refills: 3 | Status: SHIPPED | OUTPATIENT
Start: 2021-09-27 | End: 2022-06-06

## 2021-09-27 RX ORDER — ESTRADIOL 0.1 MG/D
1 FILM, EXTENDED RELEASE TRANSDERMAL
Qty: 24 PATCH | Refills: 3 | Status: SHIPPED | OUTPATIENT
Start: 2021-09-27 | End: 2022-06-06

## 2021-09-27 NOTE — PATIENT INSTRUCTIONS
Follow-up in 2 months. See note for plan.     If you need follow-up sooner, please call. If you are doing well and do not feel you need your follow-up appointment when the time comes, please call and cancel.

## 2021-09-27 NOTE — LETTER
9/27/2021         RE: Rosita Corrigan  [address] [address] [place] [id number]        Dear Colleague,    Thank you for referring your patient, Rosita Corrigan, to the [facility]. Please see a copy of my visit note below.    Gynecologic Oncology Clinic - Established Patient Visit    Visit date: Sep 27, 2021     CC: bleeding and vasomotor symptoms    Interval history: Rosita Corrigan is a 41 year old now iatrogenically post-menopausal female who underwent laparoscopic bilateral salpingo-oophorectomy for endometriomas. She is currently using low dose oral contraceptive pills for physiologic hormone replacement therapy, however, she presents today due to persistent bleeding and hot flashes.     She has continued to bleed daily since her surgery, previously was able to use a light pad or liner but then 2 weeks ago had bleeding more like heavy period. She also has some hot flashes and night sweats. They have been worsening.     She is otherwise doing well.    Review of Systems:  As per HPI, otherwise non-contributory.     Past Surgical History:  Past Surgical History:   Procedure Laterality Date     COLONOSCOPY       CYSTOSCOPY N/A 5/28/2021    Procedure: Cystoscopy ;  Surgeon: Abdi Valero MD;  Location: [facility]     LAPAROSCOPIC SINGLE SITE SALPINGO-OOPHORECTOMY Bilateral 5/28/2021    Procedure: Single incision laparoscopic lysis of adhesions >30 min, bilateral salpingo-oophorectomy;  Surgeon: Abdi Valero MD;  Location: [facility]     MOUTH SURGERY      wisdom teeth        Physical Exam:  There were no vitals taken for this visit.   General appearance: no acute distress, well groomed, sitting comfortably   Virtual no further exam    Pathology:        Assessment:  Rosita Corrigan is a 41 year old iatrogenically post-menopausal female with symptoms of hypoestrogenic state on current hormone replacement.     Plan:  - Hormone replacement therapy: symptoms are consistent with too low of  estrogen. We reviewed first pass metabolism and recommendation to change to transdermal estrogen for dose increase. This must also be given with progesterone as she still has a uterus. This is usually accomplished with prometrium 200mg daily for 10-12 days every month or prometrium 100mg daily. She would prefer to do cyclic prometrium  - Discontinue oral contraceptive pills  - Start transdermal estradiol 0.1mg/day transdermal patch. Change patch 2x/week. Start patch with discontinuation of oral pills.  - Prometrium 200mg nightly for 10 nights every month. Wait to start until November after one month on estradiol patches.   - Return to clinic in 2 months to assess response.     - She does still have a cervix, so Pap screening should be continued per ASCCP guidelines.    I spent a total of 30 minutes on the care of Rosita Corrigan on the day of service including 25 minutes face to face time and the remainder in chart review, care coordination, and documentation on the day of service.     Abdi Valero MD    Gynecologic Oncology       Again, thank you for allowing me to participate in the care of your patient.        Sincerely,        Abdi Valero MD

## 2021-09-27 NOTE — PROGRESS NOTES
Gynecologic Oncology Clinic - Established Patient Visit    Visit date: Sep 27, 2021     CC: bleeding and vasomotor symptoms    Interval history: Rosita Corrigan is a 41 year old now iatrogenically post-menopausal female who underwent laparoscopic bilateral salpingo-oophorectomy for endometriomas. She is currently using low dose oral contraceptive pills for physiologic hormone replacement therapy, however, she presents today due to persistent bleeding and hot flashes.     She has continued to bleed daily since her surgery, previously was able to use a light pad or liner but then 2 weeks ago had bleeding more like heavy period. She also has some hot flashes and night sweats. They have been worsening.     She is otherwise doing well.    Review of Systems:  As per HPI, otherwise non-contributory.     Past Surgical History:  Past Surgical History:   Procedure Laterality Date     COLONOSCOPY       CYSTOSCOPY N/A 5/28/2021    Procedure: Cystoscopy ;  Surgeon: Abdi Valero MD;  Location: [facility]     LAPAROSCOPIC SINGLE SITE SALPINGO-OOPHORECTOMY Bilateral 5/28/2021    Procedure: Single incision laparoscopic lysis of adhesions >30 min, bilateral salpingo-oophorectomy;  Surgeon: Abdi Valero MD;  Location: [facility]     MOUTH SURGERY      wisdom teeth        Physical Exam:  There were no vitals taken for this visit.   General appearance: no acute distress, well groomed, sitting comfortably   Virtual no further exam    Pathology:        Assessment:  Rosita Corrigan is a 41 year old iatrogenically post-menopausal female with symptoms of hypoestrogenic state on current hormone replacement.     Plan:  - Hormone replacement therapy: symptoms are consistent with too low of estrogen. We reviewed first pass metabolism and recommendation to change to transdermal estrogen for dose increase. This must also be given with progesterone as she still has a uterus. This is usually accomplished with prometrium 200mg daily for 10-12 days every  month or prometrium 100mg daily. She would prefer to do cyclic prometrium  - Discontinue oral contraceptive pills  - Start transdermal estradiol 0.1mg/day transdermal patch. Change patch 2x/week. Start patch with discontinuation of oral pills.  - Prometrium 200mg nightly for 10 nights every month. Wait to start until November after one month on estradiol patches.   - Return to clinic in 2 months to assess response.     - She does still have a cervix, so Pap screening should be continued per ASCCP guidelines.    I spent a total of 30 minutes on the care of Rosita Corrigan on the day of service including 25 minutes face to face time and the remainder in chart review, care coordination, and documentation on the day of service.     Abdi Valero MD    Gynecologic Oncology

## 2021-09-27 NOTE — PROGRESS NOTES
Infusion Nursing Note:  Rosita Corrigan presents today for   Chief Complaint   Patient presents with     Allied Health Visit     Xolair injection   .    Patient seen by provider today: No   present during visit today: Not Applicable.    Note:.      Intravenous Access:  {UMHIVACCESS:812439}    Treatment Conditions:  {UMHTXCONDITIONS:818417}      Post Infusion Assessment:  {UMHPOSTINFUSION:210866}       Discharge Plan:   {UMHDISCHARGE:812210}      Stephania Hair, CMA

## 2021-10-03 ENCOUNTER — HEALTH MAINTENANCE LETTER (OUTPATIENT)
Age: 41
End: 2021-10-03

## 2021-10-28 ENCOUNTER — PATIENT OUTREACH (OUTPATIENT)
Dept: ONCOLOGY | Facility: CLINIC | Age: 41
End: 2021-10-28

## 2021-10-28 NOTE — PROGRESS NOTES
[facility]:  Care Coordination Note    Received telephone call from patient this afternoon, calling with medication questions. Patient shares that she is currently using the estrogen patches, and is planning to start taking the oral progesterone (Prometrium) soon.  Patient wishes to clarify if she should continue using the estrogen patches once she starts the oral progesterone, or if she should discontinue the patches.    Writer reviewed patient's question with Christina Hernandez CNP, who advises:    She should continue to estrogen patch then take the PO progesterone for 10 days.  This is inducing a period.     Patient was provided with this recommendation, and verbalized understanding.     Jeremy De Leon, RN, BSN, OCN  Oncology Care Coordinator  [facility]

## 2021-11-29 ENCOUNTER — VIRTUAL VISIT (OUTPATIENT)
Dept: ONCOLOGY | Facility: CLINIC | Age: 41
End: 2021-11-29
Attending: OBSTETRICS & GYNECOLOGY
Payer: COMMERCIAL

## 2021-11-29 DIAGNOSIS — Z79.890 PREMATURE SURGICAL MENOPAUSE ON HORMONE REPLACEMENT THERAPY: Primary | ICD-10-CM

## 2021-11-29 DIAGNOSIS — E89.40 PREMATURE SURGICAL MENOPAUSE ON HORMONE REPLACEMENT THERAPY: Primary | ICD-10-CM

## 2021-11-29 PROCEDURE — 99212 OFFICE O/P EST SF 10 MIN: CPT | Mod: 95 | Performed by: OBSTETRICS & GYNECOLOGY

## 2021-11-29 NOTE — PROGRESS NOTES
Rosita is a 41 year old who is being evaluated via a billable video visit.      How would you like to obtain your AVS? MyChart  If the video visit is dropped, the invitation should be resent by: Text to cell phone: [phone]  Will anyone else be joining your video visit? No      Video Start Time: 1221  Video-Visit Details    Type of service:  Video Visit    Video End Time:1232    Originating Location (pt. Location): Home    Distant Location (provider location):  [facility]     Platform used for Video Visit: [facility]     Gynecologic Oncology Clinic - Established Patient Visit    Visit date: Nov 29, 2021     CC: follow-up menopausal symptoms.     Interval history: Rosita Corrigan is a 41 year old iatrogenically post-menopausal female who underwent laparoscopic bilateral salpingo-oophorectomy for endometriomas. She changed to transdermal estradiol with cyclic progesterone a couple of months ago for bleeding and vasomotor symptoms.    Vasomotor symptoms are dramatically better. Bleeding stopped with the change in medications, but she had heavy menses after progestin last month with cramping on second day. Otherwise she has been doing well.       Review of Systems:  As per HPI, otherwise non-contributory.     Past Surgical History:  Past Surgical History:   Procedure Laterality Date     COLONOSCOPY       CYSTOSCOPY N/A 5/28/2021    Procedure: Cystoscopy ;  Surgeon: Abdi Valero MD;  Location: [facility] OR     LAPAROSCOPIC SINGLE SITE SALPINGO-OOPHORECTOMY Bilateral 5/28/2021    Procedure: Single incision laparoscopic lysis of adhesions >30 min, bilateral salpingo-oophorectomy;  Surgeon: Abdi Valero MD;  Location:  OR     MOUTH SURGERY      wisdom teeth        Physical Exam:  There were no vitals taken for this visit.   General appearance: no acute distress, well groomed, sitting comfortably   Virtual no further exam      Assessment:  Rosita Corrigan is a 41 year old iatrogenically  post-menopausal female with improved management of symptoms on transdermal estradiol and cyclic prometrium but still with heavy bleeding and dysmenorrhea.    Plan:  - Hormone replacement therapy:   - Continue transdermal estradiol 0.1mg/day transdermal patch. Change patch 2x/week. Start patch with discontinuation of oral pills.  - Prometrium 200mg nightly for 10 nights every month. If continued heavy bleeding or cramping, let us know and we will change to progesterone 100mg at bedtime nightly rather than cyclic. [facility].  - If those changes don't resolve symptoms, she would be a candidate for hysterectomy  - Follow-up PRN     - She does still have a cervix, so Pap screening should be continued per ASCCP guidelines.    I spent a total of 15 minutes on the care of Rosita Corrigan on the day of service including 10 minutes face to face time and the remainder in chart review, care coordination, and documentation on the day of service.     Abdi Valero MD    Gynecologic Oncology

## 2021-11-29 NOTE — LETTER
11/29/2021         RE: Rosita Corrigan  [id number] [address] [place] [id number]        Dear Colleague,    Thank you for referring your patient, Rosita Corrigan, to the [facility]. Please see a copy of my visit note below.    Rosita is a 41 year old who is being evaluated via a billable video visit.      How would you like to obtain your AVS? MyChart  If the video visit is dropped, the invitation should be resent by: Text to cell phone: [phone]  Will anyone else be joining your video visit? No      Video Start Time: 1221  Video-Visit Details    Type of service:  Video Visit    Video End Time:1232    Originating Location (pt. Location): Home    Distant Location (provider location):  [facility]     Platform used for Video Visit: [employer]     Gynecologic Oncology Clinic - Established Patient Visit    Visit date: Nov 29, 2021     CC: follow-up menopausal symptoms.     Interval history: Rosita Corrigan is a 41 year old iatrogenically post-menopausal female who underwent laparoscopic bilateral salpingo-oophorectomy for endometriomas. She changed to transdermal estradiol with cyclic progesterone a couple of months ago for bleeding and vasomotor symptoms.    Vasomotor symptoms are dramatically better. Bleeding stopped with the change in medications, but she had heavy menses after progestin last month with cramping on second day. Otherwise she has been doing well.       Review of Systems:  As per HPI, otherwise non-contributory.     Past Surgical History:  Past Surgical History:   Procedure Laterality Date     COLONOSCOPY       CYSTOSCOPY N/A 5/28/2021    Procedure: Cystoscopy ;  Surgeon: Abdi Valero MD;  Location: [facility] OR     LAPAROSCOPIC SINGLE SITE SALPINGO-OOPHORECTOMY Bilateral 5/28/2021    Procedure: Single incision laparoscopic lysis of adhesions >30 min, bilateral salpingo-oophorectomy;  Surgeon: Abdi Valero MD;  Location:  OR     MOUTH  SURGERY      wisdom teeth        Physical Exam:  There were no vitals taken for this visit.   General appearance: no acute distress, well groomed, sitting comfortably   Virtual no further exam      Assessment:  Rosita Corrigan is a 41 year old iatrogenically post-menopausal female with improved management of symptoms on transdermal estradiol and cyclic prometrium but still with heavy bleeding and dysmenorrhea.    Plan:  - Hormone replacement therapy:   - Continue transdermal estradiol 0.1mg/day transdermal patch. Change patch 2x/week. Start patch with discontinuation of oral pills.  - Prometrium 200mg nightly for 10 nights every month. If continued heavy bleeding or cramping, let us know and we will change to progesterone 100mg at bedtime nightly rather than cyclic. She will send MyChart if she wants to change.  - If those changes don't resolve symptoms, she would be a candidate for hysterectomy  - Follow-up PRN     - She does still have a cervix, so Pap screening should be continued per ASCCP guidelines.    I spent a total of 15 minutes on the care of Rosita Corrigan on the day of service including 10 minutes face to face time and the remainder in chart review, care coordination, and documentation on the day of service.     Abdi Valero MD    Gynecologic Oncology       Again, thank you for allowing me to participate in the care of your patient.        Sincerely,        Abdi Vaelro MD

## 2022-05-15 ENCOUNTER — HEALTH MAINTENANCE LETTER (OUTPATIENT)
Age: 42
End: 2022-05-15

## 2022-06-06 DIAGNOSIS — Z79.890 PREMATURE SURGICAL MENOPAUSE ON HORMONE REPLACEMENT THERAPY: ICD-10-CM

## 2022-06-06 DIAGNOSIS — E89.40 PREMATURE SURGICAL MENOPAUSE ON HORMONE REPLACEMENT THERAPY: ICD-10-CM

## 2022-06-06 RX ORDER — ESTRADIOL 0.1 MG/D
1 FILM, EXTENDED RELEASE TRANSDERMAL
Qty: 24 PATCH | Refills: 3 | Status: SHIPPED | OUTPATIENT
Start: 2022-06-06

## 2022-06-06 RX ORDER — PROGESTERONE 200 MG/1
CAPSULE ORAL
Qty: 30 CAPSULE | Refills: 3 | Status: SHIPPED | OUTPATIENT
Start: 2022-06-06 | End: 2024-07-15

## 2022-06-06 NOTE — TELEPHONE ENCOUNTER
Patient is requesting refills on the following medications:    1.) Progesterone  2.) Estradiol- would like to know if there is a cheaper alternative to this?    Last seen 11/29/21. No upcoming appointments scheduled as of yet.    Routing to care team for further review.    Maritza Hansen, RN, BSN, PHN  [facility]

## 2022-06-07 NOTE — TELEPHONE ENCOUNTER
Called patient and discussed the message as noted below. No further action needed from triage.    Christina Hernandez, APRN CNP  You 19 hours ago (1:30 PM)     BRO Salas,     I did sign these orders.  I am not sure if there is a cheaper option.  She will need to call her insurance company to find out.       Thanks,   Christina    Message text      Maritza Hansen, RN, BSN, PHN  [facility]

## 2022-09-10 ENCOUNTER — HEALTH MAINTENANCE LETTER (OUTPATIENT)
Age: 42
End: 2022-09-10

## 2022-11-01 ENCOUNTER — LAB REQUISITION (OUTPATIENT)
Dept: LAB | Facility: CLINIC | Age: 42
End: 2022-11-01

## 2022-11-01 DIAGNOSIS — Z13.1 ENCOUNTER FOR SCREENING FOR DIABETES MELLITUS: ICD-10-CM

## 2022-11-01 LAB — HBA1C MFR BLD: 5.9 %

## 2022-11-01 PROCEDURE — 83036 HEMOGLOBIN GLYCOSYLATED A1C: CPT | Performed by: OBSTETRICS & GYNECOLOGY

## 2023-06-03 ENCOUNTER — HEALTH MAINTENANCE LETTER (OUTPATIENT)
Age: 43
End: 2023-06-03

## 2024-01-25 ENCOUNTER — HOSPITAL ENCOUNTER (OUTPATIENT)
Dept: MAMMOGRAPHY | Facility: CLINIC | Age: 44
Discharge: HOME | End: 2024-01-25
Attending: OBSTETRICS & GYNECOLOGY
Payer: COMMERCIAL

## 2024-01-25 DIAGNOSIS — N64.4 MASTODYNIA: ICD-10-CM

## 2024-01-25 PROCEDURE — 77062 BREAST TOMOSYNTHESIS BI: CPT

## 2024-07-02 ENCOUNTER — HOSPITAL ENCOUNTER (EMERGENCY)
Facility: CLINIC | Age: 44
Discharge: HOME | End: 2024-07-02
Payer: COMMERCIAL

## 2024-07-02 VITALS
HEART RATE: 112 BPM | TEMPERATURE: 98.2 F | RESPIRATION RATE: 16 BRPM | OXYGEN SATURATION: 100 % | DIASTOLIC BLOOD PRESSURE: 83 MMHG | SYSTOLIC BLOOD PRESSURE: 126 MMHG

## 2024-07-02 DIAGNOSIS — R11.0 NAUSEA: ICD-10-CM

## 2024-07-02 LAB
FLUAV RNA SPEC QL NAA+PROBE: NEGATIVE
FLUBV RNA RESP QL NAA+PROBE: NEGATIVE
GROUP A STREP BY PCR: NOT DETECTED
RSV RNA SPEC NAA+PROBE: NEGATIVE
SARS-COV-2 RNA RESP QL NAA+PROBE: NEGATIVE

## 2024-07-02 PROCEDURE — 87651 STREP A DNA AMP PROBE: CPT

## 2024-07-02 PROCEDURE — G0463 HOSPITAL OUTPT CLINIC VISIT: HCPCS

## 2024-07-02 PROCEDURE — 87637 SARSCOV2&INF A&B&RSV AMP PRB: CPT

## 2024-07-02 PROCEDURE — 99213 OFFICE O/P EST LOW 20 MIN: CPT

## 2024-07-02 RX ORDER — ONDANSETRON 4 MG/1
8 TABLET, ORALLY DISINTEGRATING ORAL EVERY 8 HOURS PRN
Qty: 15 TABLET | Refills: 0 | Status: SHIPPED | OUTPATIENT
Start: 2024-07-02

## 2024-07-02 RX ORDER — ONDANSETRON 4 MG/1
8 TABLET, ORALLY DISINTEGRATING ORAL EVERY 8 HOURS PRN
Qty: 15 TABLET | Refills: 0 | Status: SHIPPED | OUTPATIENT
Start: 2024-07-02 | End: 2024-07-02

## 2024-07-02 ASSESSMENT — ACTIVITIES OF DAILY LIVING (ADL)
ADLS_ACUITY_SCORE: 35
ADLS_ACUITY_SCORE: 35

## 2024-07-02 NOTE — ED PROVIDER NOTES
History   No chief complaint on file.    HPI  Rosita Corrigan is a 44 year old female who presents to urgent care with nausea and mild suprapubic tenderness.  Patient was seen 1 day ago and diagnosed with UTI and given Bactrim for treatment.  Patient states her dysuria has mildly improved.  Patient states nausea may have developed prior to UTI symptoms.  No vomiting but endorses decreased appetite.  Denies fever, chills, hematuria, mid back pain.     Allergies:  Allergies   Allergen Reactions    Omeprazole Other (See Comments) and Difficulty breathing     dizzy    Ranitidine Dizziness       Problem List:    Patient Active Problem List    Diagnosis Date Noted    Elevated CA-125 2021     Priority: Medium     Added automatically from request for surgery [id number]      Complex cyst of both ovaries 2021     Priority: Medium     Added automatically from request for surgery [id number]      H/O gestational diabetes mellitus, not currently pregnant 2018     Priority: Medium    Anxiety 2015     Priority: Medium    Irritable bowel syndrome 2015     Priority: Medium        Past Medical History:    Past Medical History:   Diagnosis Date    Gestational diabetes      contractions        Past Surgical History:    Past Surgical History:   Procedure Laterality Date    COLONOSCOPY      CYSTOSCOPY N/A 2021    Procedure: Cystoscopy ;  Surgeon: Abdi Valero MD;  Location: [facility] OR    LAPAROSCOPIC SINGLE SITE SALPINGO-OOPHORECTOMY Bilateral 2021    Procedure: Single incision laparoscopic lysis of adhesions >30 min, bilateral salpingo-oophorectomy;  Surgeon: Abdi Valero MD;  Location: [facility] OR    MOUTH SURGERY      wisdom teeth       Family History:    Family History   Adopted: Yes   Problem Relation Age of Onset    Unknown/Adopted Daughter     Unknown/Adopted Daughter     Unknown/Adopted Mother     Unknown/Adopted Father     Unknown/Adopted Paternal Grandfather     Unknown/Adopted Paternal  Grandmother     Unknown/Adopted Maternal Grandmother     Unknown/Adopted Maternal Grandfather     Unknown/Adopted Brother     Unknown/Adopted Sister        Social History:  Marital Status:   [2]  Social History     Tobacco Use    Smoking status: Never    Smokeless tobacco: Never   Substance Use Topics    Alcohol use: Yes     Comment: occassional    Drug use: No        Medications:    ondansetron (ZOFRAN ODT) 4 MG ODT tab  estradiol (VIVELLE-DOT) 0.1 MG/24HR bi-weekly patch  progesterone (PROMETRIUM) 200 MG capsule          Review of Systems   All other systems reviewed and are negative.      Physical Exam   BP: 126/83  Pulse: 112  Temp: 98.2  F (36.8  C)  Resp: 16  SpO2: 100 %      Physical Exam  Constitutional:       General: She is not in acute distress.     Appearance: Normal appearance. She is not diaphoretic.   HENT:      Head: Atraumatic.      Mouth/Throat:      Mouth: Mucous membranes are moist.      Pharynx: No posterior oropharyngeal erythema.   Eyes:      General: No scleral icterus.     Conjunctiva/sclera: Conjunctivae normal.   Cardiovascular:      Rate and Rhythm: Normal rate.      Pulses: Normal pulses.      Heart sounds: Normal heart sounds.   Pulmonary:      Effort: Pulmonary effort is normal. No respiratory distress.      Breath sounds: Normal breath sounds.   Abdominal:      General: Bowel sounds are normal. There is no distension.      Palpations: Abdomen is soft.      Tenderness: There is no abdominal tenderness. There is no right CVA tenderness, left CVA tenderness, guarding or rebound.   Musculoskeletal:      Cervical back: Neck supple.   Skin:     General: Skin is warm.      Findings: No rash.   Neurological:      General: No focal deficit present.      Mental Status: She is alert.   Psychiatric:         Mood and Affect: Mood normal.         Behavior: Behavior normal.         ED Course        Procedures        Results for orders placed or performed during the hospital encounter of  07/02/24 (from the past 24 hour(s))   Group A Streptococcus PCR Throat Swab    Specimen: Throat; Swab   Result Value Ref Range    Group A strep by PCR Not Detected Not Detected    Narrative    The Xpert Xpress Strep A test, performed on the [employer]ument Systems, is a rapid, qualitative in vitro diagnostic test for the detection of Streptococcus pyogenes (Group A ß-hemolytic Streptococcus, Strep A) in throat swab specimens from patients with signs and symptoms of pharyngitis. The Xpert Xpress Strep A test can be used as an aid in the diagnosis of Group A Streptococcal pharyngitis. The assay is not intended to monitor treatment for Group A Streptococcus infections. The Xpert Xpress Strep A test utilizes an automated real-time polymerase chain reaction (PCR) to detect Streptococcus pyogenes DNA.   Symptomatic Influenza A/B, RSV, & SARS-CoV2 PCR (COVID-19) Nasopharyngeal    Specimen: Nasopharyngeal; Swab   Result Value Ref Range    Influenza A PCR Negative Negative    Influenza B PCR Negative Negative    RSV PCR Negative Negative    SARS CoV2 PCR Negative Negative    Narrative    Testing was performed using the Xpert Xpress CoV2/Flu/RSV Assay on the [employer] Instrument. This test should be ordered for the detection of SARS-CoV-2, influenza, and RSV viruses in individuals who meet clinical and/or epidemiological criteria. Test performance is unknown in asymptomatic patients. This test is for in vitro diagnostic use under the FDA EUA for laboratories certified under CLIA to perform high or moderate complexity testing. This test has not been FDA cleared or approved. A negative result does not rule out the presence of PCR inhibitors in the specimen or target RNA in concentration below the limit of detection for the assay. If only one viral target is positive but coinfection with multiple targets is suspected, the sample should be re-tested with another FDA cleared, approved, or authorized test, if  coinfection would change clinical management. This test was validated by the [facility]. These laboratories are certified under the Clinical Laboratory Improvement Amendments of 1988 (CLIA-88) as qualified to perform high complexity laboratory testing.       Medications - No data to display    Assessments & Plan (with Medical Decision Making)     I have reviewed the nursing notes.    I have reviewed the findings, diagnosis, plan and need for follow up with the patient.          Medical Decision Making  Patient is a 44 year old female who presents to urgent care with nausea and mild suprapubic tenderness.  Patient was seen 1 day ago and diagnosed with UTI and given Bactrim for treatment.  Patient states her dysuria has mildly improved.  Patient states nausea may have developed prior to UTI symptoms.  No vomiting but endorses decreased appetite.  Denies fever, chills, hematuria, mid back pain.     Exam above.  Patient in no acute distress.  No CVA tenderness bilaterally.  Abdomen is soft and nontender to palpation.    Group A strep swab and viral swabs obtained and are negative.    Discussed treatment options with patient including nausea medication with discharged home and close follow-up versus further workup in ER.     At this time patient has been on antibiotics for less than 24 hours and I suspect symptoms may be related to UTI.  I prescribed patient Zofran and recommended follow-up in ED in 24 hours if worsening abdominal pain or fever develops.    Prior to making a final disposition on this patient the results of patient's tests and other diagnostic studies were discussed with the patient. All questions were answered. Patient expressed understanding of the plan and was amenable to it.    Disclaimer: This note consists of symbols derived from keyboarding, dictation and/or voice recognition software. As a result, there may be errors in the script that have gone undetected. Please consider this  when interpreting information found in this chart.      Discharge Medication List as of 7/2/2024  5:35 PM        START taking these medications    Details   ondansetron (ZOFRAN ODT) 4 MG ODT tab Take 2 tablets (8 mg) by mouth every 8 hours as needed for nausea, Disp-15 tablet, R-0, E-Prescribe             Final diagnoses:   Nausea       7/2/2024   [facility] EMERGENCY DEPT       Alondra Donald PA-C  07/02/24 2102

## 2024-07-10 ENCOUNTER — APPOINTMENT (OUTPATIENT)
Dept: ULTRASOUND IMAGING | Facility: CLINIC | Age: 44
End: 2024-07-10
Attending: FAMILY MEDICINE
Payer: COMMERCIAL

## 2024-07-10 ENCOUNTER — HOSPITAL ENCOUNTER (EMERGENCY)
Facility: CLINIC | Age: 44
Discharge: HOME | End: 2024-07-10
Attending: FAMILY MEDICINE | Admitting: FAMILY MEDICINE
Payer: COMMERCIAL

## 2024-07-10 VITALS
SYSTOLIC BLOOD PRESSURE: 119 MMHG | WEIGHT: 83 LBS | BODY MASS INDEX: 19.29 KG/M2 | HEART RATE: 76 BPM | DIASTOLIC BLOOD PRESSURE: 77 MMHG | RESPIRATION RATE: 18 BRPM | TEMPERATURE: 98.3 F | OXYGEN SATURATION: 100 %

## 2024-07-10 DIAGNOSIS — N93.9 VAGINAL BLEEDING: ICD-10-CM

## 2024-07-10 DIAGNOSIS — N95.1 MENOPAUSAL HOT FLUSHES: ICD-10-CM

## 2024-07-10 LAB
ALBUMIN UR-MCNC: NEGATIVE MG/DL
ANION GAP SERPL CALCULATED.3IONS-SCNC: 12 MMOL/L (ref 7–15)
APPEARANCE UR: CLEAR
BASOPHILS # BLD AUTO: 0 10E3/UL (ref 0–0.2)
BASOPHILS NFR BLD AUTO: 1 %
BILIRUB UR QL STRIP: NEGATIVE
BUN SERPL-MCNC: 20.5 MG/DL (ref 6–20)
CALCIUM SERPL-MCNC: 9.7 MG/DL (ref 8.6–10)
CHLORIDE SERPL-SCNC: 100 MMOL/L (ref 98–107)
COLOR UR AUTO: ABNORMAL
CREAT SERPL-MCNC: 0.62 MG/DL (ref 0.51–0.95)
DEPRECATED HCO3 PLAS-SCNC: 23 MMOL/L (ref 22–29)
EGFRCR SERPLBLD CKD-EPI 2021: >90 ML/MIN/1.73M2
EOSINOPHIL # BLD AUTO: 0.4 10E3/UL (ref 0–0.7)
EOSINOPHIL NFR BLD AUTO: 5 %
ERYTHROCYTE [DISTWIDTH] IN BLOOD BY AUTOMATED COUNT: 14.4 % (ref 10–15)
GLUCOSE SERPL-MCNC: 95 MG/DL (ref 70–99)
GLUCOSE UR STRIP-MCNC: NEGATIVE MG/DL
HCT VFR BLD AUTO: 38.1 % (ref 35–47)
HGB BLD-MCNC: 12.4 G/DL (ref 11.7–15.7)
HGB UR QL STRIP: ABNORMAL
HOLD SPECIMEN: NORMAL
HOLD SPECIMEN: NORMAL
IMM GRANULOCYTES # BLD: 0 10E3/UL
IMM GRANULOCYTES NFR BLD: 0 %
KETONES UR STRIP-MCNC: NEGATIVE MG/DL
LEUKOCYTE ESTERASE UR QL STRIP: NEGATIVE
LYMPHOCYTES # BLD AUTO: 2.2 10E3/UL (ref 0.8–5.3)
LYMPHOCYTES NFR BLD AUTO: 27 %
MCH RBC QN AUTO: 27 PG (ref 26.5–33)
MCHC RBC AUTO-ENTMCNC: 32.5 G/DL (ref 31.5–36.5)
MCV RBC AUTO: 83 FL (ref 78–100)
MONOCYTES # BLD AUTO: 0.5 10E3/UL (ref 0–1.3)
MONOCYTES NFR BLD AUTO: 7 %
MUCOUS THREADS #/AREA URNS LPF: PRESENT /LPF
NEUTROPHILS # BLD AUTO: 4.9 10E3/UL (ref 1.6–8.3)
NEUTROPHILS NFR BLD AUTO: 61 %
NITRATE UR QL: NEGATIVE
NRBC # BLD AUTO: 0 10E3/UL
NRBC BLD AUTO-RTO: 0 /100
PH UR STRIP: 5 [PH] (ref 5–7)
PLATELET # BLD AUTO: 333 10E3/UL (ref 150–450)
POTASSIUM SERPL-SCNC: 4.4 MMOL/L (ref 3.4–5.3)
RBC # BLD AUTO: 4.6 10E6/UL (ref 3.8–5.2)
RBC URINE: 101 /HPF
SODIUM SERPL-SCNC: 135 MMOL/L (ref 135–145)
SP GR UR STRIP: 1.02 (ref 1–1.03)
SQUAMOUS EPITHELIAL: 2 /HPF
UROBILINOGEN UR STRIP-MCNC: NORMAL MG/DL
WBC # BLD AUTO: 8.2 10E3/UL (ref 4–11)
WBC URINE: 2 /HPF

## 2024-07-10 PROCEDURE — 84443 ASSAY THYROID STIM HORMONE: CPT

## 2024-07-10 PROCEDURE — 85025 COMPLETE CBC W/AUTO DIFF WBC: CPT | Performed by: FAMILY MEDICINE

## 2024-07-10 PROCEDURE — 99284 EMERGENCY DEPT VISIT MOD MDM: CPT | Performed by: FAMILY MEDICINE

## 2024-07-10 PROCEDURE — 80048 BASIC METABOLIC PNL TOTAL CA: CPT | Performed by: FAMILY MEDICINE

## 2024-07-10 PROCEDURE — 76856 US EXAM PELVIC COMPLETE: CPT

## 2024-07-10 PROCEDURE — 76830 TRANSVAGINAL US NON-OB: CPT

## 2024-07-10 PROCEDURE — 99284 EMERGENCY DEPT VISIT MOD MDM: CPT | Mod: 25

## 2024-07-10 PROCEDURE — 36415 COLL VENOUS BLD VENIPUNCTURE: CPT | Performed by: FAMILY MEDICINE

## 2024-07-10 PROCEDURE — 81001 URINALYSIS AUTO W/SCOPE: CPT | Performed by: FAMILY MEDICINE

## 2024-07-10 ASSESSMENT — ACTIVITIES OF DAILY LIVING (ADL)
ADLS_ACUITY_SCORE: 35

## 2024-07-10 ASSESSMENT — COLUMBIA-SUICIDE SEVERITY RATING SCALE - C-SSRS: 1. IN THE PAST MONTH, HAVE YOU WISHED YOU WERE DEAD OR WISHED YOU COULD GO TO SLEEP AND NOT WAKE UP?: NO

## 2024-07-10 NOTE — ED TRIAGE NOTES
Arrives from home concerned for vaginal spotting vs hematuria. Started this am, Is on hormone replacement & normally gets period every 13th of the month but started early. C/o lower back pain & feeling bloated throughout belly. Had recent UTI but treated & now denies urinary symptoms.    Triage Assessment (Adult)       Row Name 07/10/24 [id number]          Triage Assessment    Airway WDL WDL        Respiratory WDL    Respiratory WDL WDL        Skin Circulation/Temperature WDL    Skin Circulation/Temperature WDL WDL        Cardiac WDL    Cardiac WDL WDL        Peripheral/Neurovascular WDL    Peripheral Neurovascular WDL WDL        Cognitive/Neuro/Behavioral WDL    Cognitive/Neuro/Behavioral WDL WDL

## 2024-07-10 NOTE — ED PROVIDER NOTES
History     Chief Complaint   Patient presents with    Vaginal Bleeding     Unsure if period vs urine     HPI  Rosita Corrigan is a 44 year old female, past medical history is significant for ovarian cystic disease, gestational diabetes, anxiety, irritable bowel syndrome, presents to the emergency department with concerns of vaginal bleeding.  History is obtained from the patient who states that she just recently had treatment with some antibiotic for a UTI and completed the antibiotic 2 days ago.  This afternoon a couple of hours prior to presentation she had some spotting in the toilet bowl and is not sure if the blood is coming from her vagina or could represent a urine infection.  The patient upon reviewing the EHR was on nitrofurantoin.  She is also on progesterone 10 days out of the month and is currently taking progesterone and normally does not bleed until she is finished with the progesterone so vaginal bleeding would be unusual.  Typically has her period on the 13th of the month.  She is not sure where the blood might be coming from.  She has some vague pelvic discomfort also over the last couple of days, some pain in her left lower quadrant that she tells me she is getting further evaluation for through her care team this coming Monday.  Denies any nausea or vomiting, no fever chills or sweats.  The patient is status post bilateral oophorectomy, but still has her uterus.      Allergies:  Allergies   Allergen Reactions    Omeprazole Other (See Comments) and Difficulty breathing     dizzy    Ranitidine Dizziness       Problem List:    Patient Active Problem List    Diagnosis Date Noted    Elevated CA-125 05/17/2021     Priority: Medium     Added automatically from request for surgery [id number]      Complex cyst of both ovaries 05/13/2021     Priority: Medium     Added automatically from request for surgery [id number]      H/O gestational diabetes mellitus, not currently pregnant 11/09/2018     Priority: Medium     Anxiety 2015     Priority: Medium    Irritable bowel syndrome 2015     Priority: Medium        Past Medical History:    Past Medical History:   Diagnosis Date    Gestational diabetes      contractions        Past Surgical History:    Past Surgical History:   Procedure Laterality Date    COLONOSCOPY      CYSTOSCOPY N/A 2021    Procedure: Cystoscopy ;  Surgeon: Abdi Valero MD;  Location: [facility] OR    LAPAROSCOPIC SINGLE SITE SALPINGO-OOPHORECTOMY Bilateral 2021    Procedure: Single incision laparoscopic lysis of adhesions >30 min, bilateral salpingo-oophorectomy;  Surgeon: Abdi Valero MD;  Location: [facility] OR    MOUTH SURGERY      wisdom teeth       Family History:    Family History   Adopted: Yes   Problem Relation Age of Onset    Unknown/Adopted Daughter     Unknown/Adopted Daughter     Unknown/Adopted Mother     Unknown/Adopted Father     Unknown/Adopted Paternal Grandfather     Unknown/Adopted Paternal Grandmother     Unknown/Adopted Maternal Grandmother     Unknown/Adopted Maternal Grandfather     Unknown/Adopted Brother     Unknown/Adopted Sister        Social History:  Marital Status:   [2]  Social History     Tobacco Use    Smoking status: Never    Smokeless tobacco: Never   Substance Use Topics    Alcohol use: Yes     Comment: occassional    Drug use: No        Medications:    estradiol (VIVELLE-DOT) 0.1 MG/24HR bi-weekly patch  ondansetron (ZOFRAN ODT) 4 MG ODT tab  progesterone (PROMETRIUM) 200 MG capsule          Review of Systems   All other systems reviewed and are negative.      Physical Exam   BP: 137/81  Pulse: 86  Temp: 98.3  F (36.8  C)  Resp: 18  Weight: 37.6 kg (83 lb)  SpO2: 100 %      Physical Exam  Vitals and nursing note reviewed.   Constitutional:       General: She is not in acute distress.     Appearance: Normal appearance. She is normal weight. She is not ill-appearing.   Abdominal:      General: Bowel sounds are normal.      Palpations: Abdomen is  soft.   Genitourinary:     Comments: Chaperoned pelvic exam, patient's  present, patient's nurse present Sujatha Umanzor:  With the patient in the lithotomy position inspected the perineum which was unremarkable.  Lit speculum inserted, obvious blood and clot in the vagina which was swabbed away with sterile cotton swabs.  No active bleeding visualized.          Neurological:      Mental Status: She is alert.         ED Course        Procedures         Results for orders placed or performed during the hospital encounter of 07/10/24 (from the past 24 hour(s))   UA with Microscopic reflex to Culture    Specimen: Urine, Clean Catch   Result Value Ref Range    Color Urine Straw Colorless, Straw, Light Yellow, Yellow    Appearance Urine Clear Clear    Glucose Urine Negative Negative mg/dL    Bilirubin Urine Negative Negative    Ketones Urine Negative Negative mg/dL    Specific Gravity Urine 1.016 1.003 - 1.035    Blood Urine Large (A) Negative    pH Urine 5.0 5.0 - 7.0    Protein Albumin Urine Negative Negative mg/dL    Urobilinogen Urine Normal Normal, 2.0 mg/dL    Nitrite Urine Negative Negative    Leukocyte Esterase Urine Negative Negative    Mucus Urine Present (A) None Seen /LPF    RBC Urine 101 (H) <=2 /HPF    WBC Urine 2 <=5 /HPF    Squamous Epithelials Urine 2 (H) <=1 /HPF    Narrative    Urine Culture not indicated   US Pelvic Complete with Transvaginal    Narrative    EXAM: US PELVIC TRANSABDOMINAL AND TRANSVAGINAL  LOCATION: [facility]  DATE: 7/10/2024    INDICATION: Vaginal bleeding, pelvic discomfort, left sided pelvic pain.  Status post bilateral salpingo oophorectomy.  On HRT  COMPARISON: None.  TECHNIQUE: Transabdominal scans were performed. Endovaginal ultrasound was performed to better visualize the adnexa.    FINDINGS:    UTERUS: 8.1 x 5.2 x 5.0 cm. Retroflexed uterus. 5 mm intramuscular fibroid in the fundus.    ENDOMETRIUM: 11 mm. Normal smooth  endometrium.    RIGHT OVARY: Oophorectomy    LEFT OVARY: Oophorectomy    No significant free fluid.      Impression    IMPRESSION:  1.  No acute abnormalities identified in the pelvis.         CBC with platelets, differential    Narrative    The following orders were created for panel order CBC with platelets, differential.  Procedure                               Abnormality         Status                     ---------                               -----------         ------                     CBC with platelets and d...[[id number]]                      Final result                 Please view results for these tests on the individual orders.   Basic metabolic panel   Result Value Ref Range    Sodium 135 135 - 145 mmol/L    Potassium 4.4 3.4 - 5.3 mmol/L    Chloride 100 98 - 107 mmol/L    Carbon Dioxide (CO2) 23 22 - 29 mmol/L    Anion Gap 12 7 - 15 mmol/L    Urea Nitrogen 20.5 (H) 6.0 - 20.0 mg/dL    Creatinine 0.62 0.51 - 0.95 mg/dL    GFR Estimate >90 >60 mL/min/1.73m2    Calcium 9.7 8.6 - 10.0 mg/dL    Glucose 95 70 - 99 mg/dL   [place] Draw    Narrative    The following orders were created for panel order [place] Draw.  Procedure                               Abnormality         Status                     ---------                               -----------         ------                     Extra Blue Top Tube[[id number]]                              In process                 Extra Red Top Tube[[id number]]                               In process                   Please view results for these tests on the individual orders.   CBC with platelets and differential   Result Value Ref Range    WBC Count 8.2 4.0 - 11.0 10e3/uL    RBC Count 4.60 3.80 - 5.20 10e6/uL    Hemoglobin 12.4 11.7 - 15.7 g/dL    Hematocrit 38.1 35.0 - 47.0 %    MCV 83 78 - 100 fL    MCH 27.0 26.5 - 33.0 pg    MCHC 32.5 31.5 - 36.5 g/dL    RDW 14.4 10.0 - 15.0 %    Platelet Count 333 150 - 450 10e3/uL    % Neutrophils 61 %    % Lymphocytes 27  %    % Monocytes 7 %    % Eosinophils 5 %    % Basophils 1 %    % Immature Granulocytes 0 %    NRBCs per 100 WBC 0 <1 /100    Absolute Neutrophils 4.9 1.6 - 8.3 10e3/uL    Absolute Lymphocytes 2.2 0.8 - 5.3 10e3/uL    Absolute Monocytes 0.5 0.0 - 1.3 10e3/uL    Absolute Eosinophils 0.4 0.0 - 0.7 10e3/uL    Absolute Basophils 0.0 0.0 - 0.2 10e3/uL    Absolute Immature Granulocytes 0.0 <=0.4 10e3/uL    Absolute NRBCs 0.0 10e3/uL     8:03 PM  I reviewed this patient's presentation lab diagnostics and imaging with on-call OB/GYN Dr. Delvalle.  She was willing to see the patient in follow-up this coming Monday her call day if they would like to be seen here in follow-up to discuss options.  The patient is not unstable at this point and has very scant bleeding presentation.  Stable vital signs and can be discharged home with stable hemoglobin today based upon hemoglobin comparison from 3 years ago.  No significant change.  If the bleeding increases or she becomes concerned with increased pain or cramping nausea vomiting she should return to the emergency department but otherwise either follow-up with Dr. Delvalle in clinic or with her OB/GYN provider in [place].      Medications - No data to display    Assessments & Plan (with Medical Decision Making)   Assessment and plan with medical decision making at the time stamp above.      Disclaimer: This note consists of symbols derived from keyboarding, dictation and/or voice recognition software. As a result, there may be errors in the script that have gone undetected. Please consider this when interpreting information found in this chart.      I have reviewed the nursing notes.    I have reviewed the findings, diagnosis, plan and need for follow up with the patient.        New Prescriptions    No medications on file       Final diagnoses:   Vaginal bleeding       7/10/2024   [facility] EMERGENCY DEPT       Sotero Ford MD  07/10/24 2004

## 2024-07-11 ENCOUNTER — TELEPHONE (OUTPATIENT)
Dept: OBGYN | Facility: CLINIC | Age: 44
End: 2024-07-11
Payer: COMMERCIAL

## 2024-07-11 NOTE — DISCHARGE INSTRUCTIONS
Your diagnostic evaluation here as we discussed is reassuring however unclear as to why you are bleeding while still on progesterone.  I discussed your presentation with Dr. Delvalle for OB/GYN here.  If you are not able to get into see your OB in a timely fashion sometime in the next week or so she is willing to see you when she is on-call on Monday of next week.  You will simply need to call and make an appointment at the number provided.  Return to the emergency department if worse or changes.  Continue all current medications.  As we discussed there are medical and surgical options for your vaginal bleeding that can be considered.

## 2024-07-11 NOTE — TELEPHONE ENCOUNTER
[facility] Call Center    Phone Message    May a detailed message be left on voicemail: yes     Reason for Call: Other: . Pt is calling in, she was in the ED for vaginal bleeding and was told to follow up with  on her on call schedule for 7/15/24, writer reached out to chat to get approval and didn't receive a response until pt hung up, please call Rosita, thank you     Action Taken: Message routed to:  Other: obgyn    Travel Screening: Not Applicable     Date of Service:

## 2024-07-11 NOTE — TELEPHONE ENCOUNTER
Patient calling back / patient scheduled for tomorrow for follow up.    Bria RAZO   Ob/Gyn Clinic

## 2024-07-12 ENCOUNTER — TELEPHONE (OUTPATIENT)
Dept: OBGYN | Facility: CLINIC | Age: 44
End: 2024-07-12

## 2024-07-12 ENCOUNTER — OFFICE VISIT (OUTPATIENT)
Dept: OBGYN | Facility: CLINIC | Age: 44
End: 2024-07-12
Payer: COMMERCIAL

## 2024-07-12 VITALS
TEMPERATURE: 97.4 F | WEIGHT: 81.6 LBS | BODY MASS INDEX: 18.88 KG/M2 | HEIGHT: 55 IN | RESPIRATION RATE: 18 BRPM | SYSTOLIC BLOOD PRESSURE: 130 MMHG | DIASTOLIC BLOOD PRESSURE: 94 MMHG | HEART RATE: 109 BPM

## 2024-07-12 DIAGNOSIS — N95.1 MENOPAUSAL HOT FLUSHES: ICD-10-CM

## 2024-07-12 DIAGNOSIS — N93.9 ABNORMAL UTERINE BLEEDING (AUB): Primary | ICD-10-CM

## 2024-07-12 LAB — TSH SERPL DL<=0.005 MIU/L-ACNC: 1.21 UIU/ML (ref 0.3–4.2)

## 2024-07-12 PROCEDURE — G2211 COMPLEX E/M VISIT ADD ON: HCPCS | Performed by: STUDENT IN AN ORGANIZED HEALTH CARE EDUCATION/TRAINING PROGRAM

## 2024-07-12 PROCEDURE — 99204 OFFICE O/P NEW MOD 45 MIN: CPT | Performed by: STUDENT IN AN ORGANIZED HEALTH CARE EDUCATION/TRAINING PROGRAM

## 2024-07-12 PROCEDURE — 99459 PELVIC EXAMINATION: CPT | Performed by: STUDENT IN AN ORGANIZED HEALTH CARE EDUCATION/TRAINING PROGRAM

## 2024-07-12 RX ORDER — MEDROXYPROGESTERONE ACETATE 2.5 MG/1
2.5 TABLET ORAL DAILY
Qty: 90 TABLET | Refills: 3 | Status: SHIPPED | OUTPATIENT
Start: 2024-07-12 | End: 2024-09-04

## 2024-07-12 NOTE — TELEPHONE ENCOUNTER
"Patient calling to ask:    \"I just wanted to make sure whether or not I am still supposed to take the estrogen patches as well and change them twice a week along with the new medication (Provera)?..\"    Please advise. Vidhi Chen RN        "

## 2024-07-12 NOTE — PROGRESS NOTES
"[facility]  Gynecology Office Note    Assessment and Plan:   Rosita Corrigan, 44 year old  s/p BSO, presented for change in her menstrual bleeding. Pelvic exam did not identify any specific structural abnormalities that could exam her change in bleeding pattern. TVUS from 7/10 also normal.    TSH added on to existing blood for her worsening hot flushes, which returned normal. Plan to continue estrogen patch, but switch to daily edroxyPROGESTERone (PROVERA) 2.5 MG tablet after her induced menses this month to see whether her bleeding will completely stop. Plan to return on  to follow up on her bleeding pattern.    medroxyPROGESTERone (PROVERA) 2.5 MG tablet          Take 1 tablet (2.5 mg) by mouth daily, Disp-90 tablet, R-3, E-Prescribe     Adelia Delvalle MD  Obstetrics and Gynecology  [facility]   2024     -----------------------------------------------------------------------------------------------------------------------------------    HPI: Rosita Corrigan, 44 year old  s/p BSO, presented for change in her menstrual bleeding.    On 2021, \"single incision laparoscopic bilateral salpingo-oophorectomy, lysis of adhesions (30 min) , cystoscopy\" was performed for bilateral ovarian cysts and elevated  concerning for probable endometriomas but some concern for malignancy. Intraoperative findings were notable for liver surface with significant Xrli-Ryst-Gytewj adhesions, diaphragm, stomach edge; significant amount of adhesion due to endometriosis with sigmoid colon and posterior cervix. Final pathology returned with endometrioma and acute salpingitis.    After BSO, patient has tried norethindrone-ethinyl estradiol (JUNEL FE 1/20) 1-20 MG-MCG tablet as hormonal replacement therapy, which she had ongoing vaginal spotting. Patient has been on continuous estradiol (VIVELLE-DOT) 0.1 MG/24HR bi-weekly patch and progesterone (PROMETRIUM) 200 MG capsule " for 10 days of the month for now about 2 years. On day 3 after finishing up her progesterone pills, she would start having her menses, which last for 6-7 days. However, since her recent completion of antibiotic course for UTI treatment, her bleeding got heavier and was soaking through pads.    Patient presented to the ER on 7/10 due to this change in bleeding pattern. CBC did not show anemia. TVUS did not show any structural abnormalities.    Patient endorsed ongoing hot flushes.    ROS: A 10 pt ROS was completed and found to be otherwise negative unless mentioned in the HPI.     OB History    Para Term  AB Living   2 2 2 0 0 2   SAB IAB Ectopic Multiple Live Births   0 0 0 0 2      # Outcome Date GA Lbr Samuel/2nd Weight Sex Type Anes PTL Lv   2 Term 12 37w0d  2.126 kg (4 lb 11 oz) F  EPI  RAUL      Complications: Gestational diabetes      Name: Jo   1 Term 09 37w0d  2.07 kg (4 lb 9 oz) F  EPI  RAUL      Complications: Gestational diabetes      Name: Jayla     Last pap smear 2021 w/ neg HPV    Past Medical History:   Diagnosis Date    Gestational diabetes      Patient Active Problem List    Diagnosis Date Noted    H/O gestational diabetes mellitus, not currently pregnant 2018     Priority: Medium    Anxiety 2015     Priority: Medium    Irritable bowel syndrome 2015     Priority: Medium     Past Surgical History:   Procedure Laterality Date    COLONOSCOPY      CYSTOSCOPY N/A 2021    Procedure: Cystoscopy ;  Surgeon: Abdi Valero MD;  Location: [facility] OR    LAPAROSCOPIC SINGLE SITE SALPINGO-OOPHORECTOMY Bilateral 2021    Procedure: Single incision laparoscopic lysis of adhesions >30 min, bilateral salpingo-oophorectomy;  Surgeon: Abdi Valero MD;  Location: [facility] OR    MOUTH SURGERY      wisdom teeth     Current Outpatient Medications   Medication Sig Dispense Refill    estradiol (VIVELLE-DOT) 0.1 MG/24HR bi-weekly patch Place 1 patch onto the skin twice  a week 24 patch 3    medroxyPROGESTERone (PROVERA) 2.5 MG tablet Take 1 tablet (2.5 mg) by mouth daily 90 tablet 3    ondansetron (ZOFRAN ODT) 4 MG ODT tab Take 2 tablets (8 mg) by mouth every 8 hours as needed for nausea 15 tablet 0     No current facility-administered medications for this visit.     Allergies   Allergen Reactions    Omeprazole Other (See Comments) and Difficulty breathing     dizzy    Ranitidine Dizziness     Social History     Socioeconomic History    Marital status:      Spouse name: Not on file    Number of children: Not on file    Years of education: Not on file    Highest education level: Not on file   Occupational History    Not on file   Tobacco Use    Smoking status: Never    Smokeless tobacco: Never   Vaping Use    Vaping status: Never Used   Substance and Sexual Activity    Alcohol use: Yes     Comment: occassional    Drug use: No    Sexual activity: Yes     Partners: Male     Birth control/protection: Surgical, Condom     Comment: vasectomy   Other Topics Concern    Parent/sibling w/ CABG, MI or angioplasty before 65F 55M? No     Comment: adopted-unknown   Social History Narrative    Not on file     Social Determinants of Health     Financial Resource Strain: Not on file   Food Insecurity: Not on file   Transportation Needs: Not on file   Physical Activity: Not on file   Stress: Not on file   Social Connections: Unknown (2/19/2024)    Received from [facility]    Social Connections     Frequency of Communication with Friends and Family: Not on file   Interpersonal Safety: Not on file   Housing Stability: Not on file     Family History   Adopted: Yes   Problem Relation Age of Onset    Unknown/Adopted Daughter     Unknown/Adopted Daughter     Unknown/Adopted Mother     Unknown/Adopted Father     Unknown/Adopted Paternal Grandfather     Unknown/Adopted Paternal Grandmother     Unknown/Adopted Maternal Grandmother     Unknown/Adopted Maternal  "Grandfather     Unknown/Adopted Brother     Unknown/Adopted Sister      Physical Exam:   Vitals:    07/12/24 1058   BP: (!) 130/94   BP Location: Left arm   Patient Position: Chair   Cuff Size: Adult Regular   Pulse: 109   Resp: 18   Temp: 97.4  F (36.3  C)   TempSrc: Tympanic   Weight: 37 kg (81 lb 9.6 oz)   Height: 1.397 m (4' 7\")      Estimated body mass index is 18.97 kg/m  as calculated from the following:    Height as of this encounter: 1.397 m (4' 7\").    Weight as of this encounter: 37 kg (81 lb 9.6 oz).    General appearance: well-hydrated, A&O x 3, no apparent distress  Lungs: Equal expansion bilaterally, no accessory muscle use  Heart: No heaves or thrills.   Constitutional: See vitals  Abdomen: Soft, non-tender, non-distended. No rebound, rigidity, or guarding.  Neuro: CN II-XII grossly intact  Genitourinary:  External genitalia: no erythema, no lesions.   Urethral meatus appropriate location without lesions or prolapse  Urethra: No masses, tenderness, or scarring  Bladder no fullness, masses, or tenderness.  Anus and Perineum: Unremarkable, no visible lesions  Vagina: Normal, healthy pink mucosa without any lesions. Physiologic vaginal discharge.   Cervix: normal appearance, no cervical motion tenderness.   Uterus: normal size, shape and consistency.   Adnexa: no masses or tenderness bilaterally.    Labs/Imaging:   Narrative & Impression   EXAM: US PELVIC TRANSABDOMINAL AND TRANSVAGINAL  LOCATION: [facility]  DATE: 7/10/2024     INDICATION: Vaginal bleeding, pelvic discomfort, left sided pelvic pain.  Status post bilateral salpingo oophorectomy.  On HRT  COMPARISON: None.  TECHNIQUE: Transabdominal scans were performed. Endovaginal ultrasound was performed to better visualize the adnexa.     FINDINGS:     UTERUS: 8.1 x 5.2 x 5.0 cm. Retroflexed uterus. 5 mm intramuscular fibroid in the fundus.     ENDOMETRIUM: 11 mm. Normal smooth endometrium.     RIGHT OVARY: Oophorectomy   "   LEFT OVARY: Oophorectomy     No significant free fluid.                                                                      IMPRESSION:  1.  No acute abnormalities identified in the pelvis.

## 2024-07-12 NOTE — TELEPHONE ENCOUNTER
[facility] Call Center    Phone Message    May a detailed message be left on voicemail: yes     Reason for Call: Other: Pt calling regarding questions about the medications she is taking. Please call pt      Action Taken: Message routed to:  Other: MAYA MENDOZA    Travel Screening: Not Applicable

## 2024-07-12 NOTE — TELEPHONE ENCOUNTER
Adelia Delvalle MD McPherson, Marla, RN    Caller: Unspecified (Today, 12:19 PM)  Continue the estrogen patch like usual - twice per week.    Patient notified. Patient verbalized understanding. Vidhi Chen RN

## 2024-07-12 NOTE — NURSING NOTE
"Initial BP (!) 130/94 (BP Location: Left arm, Patient Position: Chair, Cuff Size: Adult Regular)   Pulse 109   Temp 97.4  F (36.3  C) (Tympanic)   Resp 18   Ht 1.397 m (4' 7\")   Wt 37 kg (81 lb 9.6 oz)   LMP 06/13/2024   BMI 18.97 kg/m   Estimated body mass index is 18.97 kg/m  as calculated from the following:    Height as of this encounter: 1.397 m (4' 7\").    Weight as of this encounter: 37 kg (81 lb 9.6 oz). .    "

## 2024-07-15 PROBLEM — N83.291 COMPLEX CYST OF BOTH OVARIES: Status: RESOLVED | Noted: 2021-05-13 | Resolved: 2024-07-15

## 2024-07-15 PROBLEM — N83.292 COMPLEX CYST OF BOTH OVARIES: Status: RESOLVED | Noted: 2021-05-13 | Resolved: 2024-07-15

## 2024-07-15 PROBLEM — R97.1 ELEVATED CA-125: Status: RESOLVED | Noted: 2021-05-17 | Resolved: 2024-07-15

## 2024-08-30 ENCOUNTER — TELEPHONE (OUTPATIENT)
Dept: OBGYN | Facility: CLINIC | Age: 44
End: 2024-08-30
Payer: COMMERCIAL

## 2024-08-30 NOTE — TELEPHONE ENCOUNTER
Spoke to patient and she accepted a 9-4-24 appt with Dr. Delvalle and was made aware Dr. Delvalle is on call that day as well.     Patient has chose to keep 9-20-24 appointment for now-in case a follow up appt is needd.     I added note to appt notes re: need for 9-20-24 appt ot not?    Vidhi Chen RN

## 2024-08-30 NOTE — TELEPHONE ENCOUNTER
[facility] Call Center    Phone Message    May a detailed message be left on voicemail: yes     Reason for Call: Other: Patient called stating that provider prescribed medroxyPROGESTERone (PROVERA) 2.5 MG tablet back in July. Patient started the medication end of July and has been having a menstrual consistently. Patient states menstrual cycle would start as normal, it would stop for less than a week then it will start back up again. Patient wants to know if provider would like to see her sooner than her 09/20 appt she has scheduled. Please contact patient in regards to this message. Thank you     Action Taken: Other: Women's    Travel Screening: Not Applicable     Date of Service:

## 2024-09-04 ENCOUNTER — OFFICE VISIT (OUTPATIENT)
Dept: OBGYN | Facility: CLINIC | Age: 44
End: 2024-09-04
Payer: COMMERCIAL

## 2024-09-04 VITALS
BODY MASS INDEX: 19.77 KG/M2 | WEIGHT: 85.4 LBS | DIASTOLIC BLOOD PRESSURE: 82 MMHG | HEIGHT: 55 IN | HEART RATE: 75 BPM | SYSTOLIC BLOOD PRESSURE: 126 MMHG | RESPIRATION RATE: 18 BRPM

## 2024-09-04 DIAGNOSIS — R23.2 VASOMOTOR FLUSHING: Primary | ICD-10-CM

## 2024-09-04 DIAGNOSIS — N95.1 MENOPAUSAL HOT FLUSHES: ICD-10-CM

## 2024-09-04 DIAGNOSIS — L29.9 ITCHING: ICD-10-CM

## 2024-09-04 DIAGNOSIS — N93.9 ABNORMAL UTERINE BLEEDING (AUB): ICD-10-CM

## 2024-09-04 LAB
ALBUMIN SERPL BCG-MCNC: 4.4 G/DL (ref 3.5–5.2)
ALP SERPL-CCNC: 69 U/L (ref 40–150)
ALT SERPL W P-5'-P-CCNC: 12 U/L (ref 0–50)
ANION GAP SERPL CALCULATED.3IONS-SCNC: 8 MMOL/L (ref 7–15)
AST SERPL W P-5'-P-CCNC: 19 U/L (ref 0–45)
BILIRUB SERPL-MCNC: 0.3 MG/DL
BUN SERPL-MCNC: 12.1 MG/DL (ref 6–20)
CALCIUM SERPL-MCNC: 9.5 MG/DL (ref 8.8–10.4)
CHLORIDE SERPL-SCNC: 104 MMOL/L (ref 98–107)
CREAT SERPL-MCNC: 0.56 MG/DL (ref 0.51–0.95)
EGFRCR SERPLBLD CKD-EPI 2021: >90 ML/MIN/1.73M2
GLUCOSE SERPL-MCNC: 96 MG/DL (ref 70–99)
HCO3 SERPL-SCNC: 26 MMOL/L (ref 22–29)
POTASSIUM SERPL-SCNC: 4.9 MMOL/L (ref 3.4–5.3)
PROT SERPL-MCNC: 7.7 G/DL (ref 6.4–8.3)
SODIUM SERPL-SCNC: 138 MMOL/L (ref 135–145)

## 2024-09-04 PROCEDURE — 36415 COLL VENOUS BLD VENIPUNCTURE: CPT | Performed by: STUDENT IN AN ORGANIZED HEALTH CARE EDUCATION/TRAINING PROGRAM

## 2024-09-04 PROCEDURE — 58100 BIOPSY OF UTERUS LINING: CPT | Performed by: STUDENT IN AN ORGANIZED HEALTH CARE EDUCATION/TRAINING PROGRAM

## 2024-09-04 PROCEDURE — 99459 PELVIC EXAMINATION: CPT | Performed by: STUDENT IN AN ORGANIZED HEALTH CARE EDUCATION/TRAINING PROGRAM

## 2024-09-04 PROCEDURE — 80053 COMPREHEN METABOLIC PANEL: CPT | Performed by: STUDENT IN AN ORGANIZED HEALTH CARE EDUCATION/TRAINING PROGRAM

## 2024-09-04 PROCEDURE — 88305 TISSUE EXAM BY PATHOLOGIST: CPT | Performed by: PATHOLOGY

## 2024-09-04 PROCEDURE — 99214 OFFICE O/P EST MOD 30 MIN: CPT | Mod: 25 | Performed by: STUDENT IN AN ORGANIZED HEALTH CARE EDUCATION/TRAINING PROGRAM

## 2024-09-04 RX ORDER — IBUPROFEN 200 MG
400 TABLET ORAL ONCE
Status: COMPLETED | OUTPATIENT
Start: 2024-09-04 | End: 2024-09-04

## 2024-09-04 RX ORDER — GABAPENTIN 100 MG/1
100 CAPSULE ORAL AT BEDTIME
Qty: 90 CAPSULE | Refills: 3 | Status: SHIPPED | OUTPATIENT
Start: 2024-09-04

## 2024-09-04 RX ORDER — PROGESTERONE 200 MG/1
200 CAPSULE ORAL DAILY
Qty: 36 CAPSULE | Refills: 2 | Status: SHIPPED | OUTPATIENT
Start: 2024-09-04

## 2024-09-04 RX ADMIN — Medication 400 MG: at 10:55

## 2024-09-04 NOTE — NURSING NOTE
"Initial /82 (BP Location: Right arm, Patient Position: Chair, Cuff Size: Adult Regular)   Pulse 75   Resp 18   Ht 1.397 m (4' 7\")   Wt 38.7 kg (85 lb 6.4 oz)   LMP 08/22/2024   BMI 19.85 kg/m   Estimated body mass index is 19.85 kg/m  as calculated from the following:    Height as of this encounter: 1.397 m (4' 7\").    Weight as of this encounter: 38.7 kg (85 lb 6.4 oz). .    " Patient Seen in: [facility] [place]      History   Patient presents with:  Testing    Stated Complaint: requesting covid test fever and ha yesterday, wants to make sure she can travel*    HPI/Subjective:   HPI    20-year-old female HEART: Regular rate and rhythm, no murmurs. LUNGS: Clear to auscultation bilaterally. No Rales, no rhonchi, no wheezing, no stridor.   ABDOMEN: Soft, nondistended,non tender  EXTREMITIES: No peripheral edema    ED Course     Labs Reviewed   RAPID SARS-C

## 2024-09-04 NOTE — PROGRESS NOTES
Clinic Administered Medication Documentation    Patient was given ibuprofen 400mg. Prior to medication administration, verified patient's identity using patient's name and date of birth.    Sisi Garcia CMA

## 2024-09-04 NOTE — PROGRESS NOTES
[facility]  Gynecology Office Note    Assessment and Plan:   Rosita Corrigan, 44 year old  s/p BSO, presented for ongoing irregular bleeding while on continuous estradiol (VIVELLE-DOT) 0.1 MG/24HR bi-weekly patch and PO medroxyprogesterone 2.5mg daily. Office EMB obtained. Discussed switching back to PO micronized progesterone (PROMETRIUM) 200 MG capsule for 12 days of the month versus Mirena IUD. Patient elected PO micronized progesterone (PROMETRIUM) 200 MG capsule for 12 days of the month.    For her hot flushes, we discussed trying gabapentin versus paroxetine. She elected trying gabapentin.    For her whole body itching, CMP was obtained, which returned normal.      gabapentin (NEURONTIN) 100 MG capsule          Take 1 capsule (100 mg) by mouth at bedtime., Disp-90 capsule, R-3, E-Prescribe       progesterone (PROMETRIUM) 200 MG capsule         Take 1 capsule (200 mg) by mouth daily. for the first 12 days of the month., Disp-36 capsule, R-2, E-Prescribe     Office Endometrial Biopsy Note  Informed consent was obtained after informed patient of risks of procedure, including minimal bleeding, low risk of infection, and discomfort. Oral ibuprofen 800 mg was given. Patient was placed in dorsolithotomy position. Using a medium Gian's speculum, the cervix was visualized. The cervix was prepped with Betadine swabs x3. Local lidocaine gel was applied on the cervix. A single tooth tenaculum was applied to the anterior lip of the cervix. The endometrial pipelle was advanced through the cervix without difficulty and a sample collected. The tenaculum was removed from the cervix and the tenaculum site made hemostatic with pressure.  Speculum was removed.  Patient tolerated the procedure well.     Adelia Delvalle MD  Obstetrics and Gynecology  [facility]   2024    "  -----------------------------------------------------------------------------------------------------------------------------------  S: Rosita Corrigan, 44 year old  s/p BSO, presented for ongoing irregular bleeding.     On 2021, \"single incision laparoscopic bilateral salpingo-oophorectomy, lysis of adhesions (30 min) , cystoscopy\" was performed for bilateral ovarian cysts and elevated  concerning for probable endometriomas but some concern for malignancy. Intraoperative findings were notable for liver surface with significant Rvij-Odav-Znuack adhesions, diaphragm, stomach edge; significant amount of adhesion due to endometriosis with sigmoid colon and posterior cervix. Final pathology returned with endometrioma and acute salpingitis.     After BSO, patient has tried norethindrone-ethinyl estradiol (JUNEL FE 1/20) 1-20 MG-MCG tablet as hormonal replacement therapy, which she had ongoing vaginal spotting. Patient has been on continuous estradiol (VIVELLE-DOT) 0.1 MG/24HR bi-weekly patch and progesterone (PROMETRIUM) 200 MG capsule for 10 days of the month for now about 2 years. On day 3 after finishing up her progesterone pills, she would start having her menses, which last for 6-7 days. However, since her recent completion of antibiotic course for UTI treatment, her bleeding got heavier and was soaking through pads.     Patient presented to the ER on 7/10 due to this change in bleeding pattern. CBC did not show anemia. TVUS did not show any structural abnormalities.     She had her menses from - and she started taking daily medroxyprogesterone 2.5mg on . She got her period  - and - with significant back pain, cramping, and dark tarry spotting. Patient endorsed ongoing hot flushes and nightly whole body itching.    O:   Vitals:    24 1023   BP: 126/82   BP Location: Right arm   Patient Position: Chair   Cuff Size: Adult Regular   Pulse: 75   Resp: 18   Weight: 38.7 " "kg (85 lb 6.4 oz)   Height: 1.397 m (4' 7\")      Estimated body mass index is 19.85 kg/m  as calculated from the following:    Height as of this encounter: 1.397 m (4' 7\").    Weight as of this encounter: 38.7 kg (85 lb 6.4 oz).    General appearance: well-hydrated, A&O x 3, no apparent distress  Lungs: Equal expansion bilaterally, no accessory muscle use  Heart: No heaves or thrills.   Constitutional: See vitals  Abdomen: Soft, non-tender, non-distended. No rebound, rigidity,  Neuro: CN II-XII grossly intact  Genitourinary:  External genitalia: no erythema, no lesions.   Urethral meatus appropriate location without lesions or prolapse  Urethra: No masses, tenderness, or scarring  Bladder no fullness, masses, or tenderness.  Anus and Perineum: Unremarkable, no visible lesions  Vagina: Normal, healthy pink mucosa without any lesions. Physiologic vaginal discharge.   Cervix: normal appearance              "

## 2024-09-06 LAB
PATH REPORT.COMMENTS IMP SPEC: NORMAL
PATH REPORT.FINAL DX SPEC: NORMAL
PATH REPORT.GROSS SPEC: NORMAL
PATH REPORT.MICROSCOPIC SPEC OTHER STN: NORMAL
PATH REPORT.RELEVANT HX SPEC: NORMAL
PHOTO IMAGE: NORMAL

## 2024-12-02 ENCOUNTER — TELEPHONE (OUTPATIENT)
Dept: OBGYN | Facility: CLINIC | Age: 44
End: 2024-12-02
Payer: COMMERCIAL

## 2024-12-02 DIAGNOSIS — Z79.890 PREMATURE SURGICAL MENOPAUSE ON HORMONE REPLACEMENT THERAPY: ICD-10-CM

## 2024-12-02 DIAGNOSIS — E89.40 PREMATURE SURGICAL MENOPAUSE ON HORMONE REPLACEMENT THERAPY: ICD-10-CM

## 2024-12-02 NOTE — TELEPHONE ENCOUNTER
Last Written Prescription Date:  6/6/22  Last Fill Quantity: 24,  # refills: 3   Last office visit: 9/4/2024 with Dr. Delvalle   Future Office Visit:  NA        Requested Prescriptions   Pending Prescriptions Disp Refills    estradiol (VIVELLE-DOT) 0.1 MG/24HR bi-weekly patch 24 patch 3     Sig: Place 1 patch onto the skin twice a week.       There is no refill protocol information for this order        Routing refill request to provider for review/approval because:  Drug not on the G refill protocol   Pt was seen in September and forgot to ask for rx refill    Thanks!    DANNI Dutta  [facility]  Ob/Gyn Clinic

## 2024-12-02 NOTE — TELEPHONE ENCOUNTER
[facility] Call Center    Phone Message    May a detailed message be left on voicemail: yes     Reason for Call: Other: . Pt was in on 9/4/24 and forgot to ask  to refill her estradiol (VIVELLE-DOT) 0.1 MG/24HR bi-weekly patch, she uses [facility] - [facility] [address] [facility], please call pt, thank you!    Action Taken: Message routed to:  Other: obgyn    Travel Screening: Not Applicable     Date of Service:

## 2024-12-03 RX ORDER — ESTRADIOL 0.1 MG/D
1 FILM, EXTENDED RELEASE TRANSDERMAL
Qty: 32 PATCH | Refills: 3 | Status: SHIPPED | OUTPATIENT
Start: 2024-12-05

## 2025-02-09 ENCOUNTER — HEALTH MAINTENANCE LETTER (OUTPATIENT)
Age: 45
End: 2025-02-09

## 2025-07-31 ENCOUNTER — OFFICE VISIT (OUTPATIENT)
Dept: FAMILY MEDICINE | Facility: CLINIC | Age: 45
End: 2025-07-31
Payer: COMMERCIAL

## 2025-07-31 ENCOUNTER — TELEPHONE (OUTPATIENT)
Dept: GASTROENTEROLOGY | Facility: CLINIC | Age: 45
End: 2025-07-31

## 2025-07-31 VITALS
OXYGEN SATURATION: 99 % | SYSTOLIC BLOOD PRESSURE: 114 MMHG | HEIGHT: 55 IN | WEIGHT: 87.2 LBS | RESPIRATION RATE: 14 BRPM | HEART RATE: 87 BPM | DIASTOLIC BLOOD PRESSURE: 70 MMHG | BODY MASS INDEX: 20.18 KG/M2

## 2025-07-31 DIAGNOSIS — R10.84 ABDOMINAL PAIN, GENERALIZED: Primary | ICD-10-CM

## 2025-07-31 DIAGNOSIS — K92.1 BLOOD IN STOOL: ICD-10-CM

## 2025-07-31 DIAGNOSIS — R14.0 ABDOMINAL BLOATING: ICD-10-CM

## 2025-07-31 LAB
ALBUMIN SERPL BCG-MCNC: 4.2 G/DL (ref 3.5–5.2)
ALBUMIN UR-MCNC: NEGATIVE MG/DL
ALP SERPL-CCNC: 67 U/L (ref 40–150)
ALT SERPL W P-5'-P-CCNC: 11 U/L (ref 0–50)
ANION GAP SERPL CALCULATED.3IONS-SCNC: 12 MMOL/L (ref 7–15)
APPEARANCE UR: CLEAR
AST SERPL W P-5'-P-CCNC: 22 U/L (ref 0–45)
BACTERIA #/AREA URNS HPF: ABNORMAL /HPF
BILIRUB SERPL-MCNC: 0.2 MG/DL
BILIRUB UR QL STRIP: NEGATIVE
BUN SERPL-MCNC: 15 MG/DL (ref 6–20)
CALCIUM SERPL-MCNC: 9.1 MG/DL (ref 8.8–10.4)
CHLORIDE SERPL-SCNC: 102 MMOL/L (ref 98–107)
COLOR UR AUTO: YELLOW
CREAT SERPL-MCNC: 0.59 MG/DL (ref 0.51–0.95)
EGFRCR SERPLBLD CKD-EPI 2021: >90 ML/MIN/1.73M2
ERYTHROCYTE [DISTWIDTH] IN BLOOD BY AUTOMATED COUNT: 14.7 % (ref 10–15)
GLUCOSE SERPL-MCNC: 99 MG/DL (ref 70–99)
GLUCOSE UR STRIP-MCNC: NEGATIVE MG/DL
HCO3 SERPL-SCNC: 22 MMOL/L (ref 22–29)
HCT VFR BLD AUTO: 38 % (ref 35–47)
HGB BLD-MCNC: 11.8 G/DL (ref 11.7–15.7)
HGB UR QL STRIP: NEGATIVE
KETONES UR STRIP-MCNC: NEGATIVE MG/DL
LEUKOCYTE ESTERASE UR QL STRIP: NEGATIVE
LIPASE SERPL-CCNC: 33 U/L (ref 13–60)
MCH RBC QN AUTO: 25.5 PG (ref 26.5–33)
MCHC RBC AUTO-ENTMCNC: 31.1 G/DL (ref 31.5–36.5)
MCV RBC AUTO: 82 FL (ref 78–100)
MUCOUS THREADS #/AREA URNS LPF: PRESENT /LPF
NITRATE UR QL: NEGATIVE
PH UR STRIP: 5.5 [PH] (ref 5–7)
PLATELET # BLD AUTO: 291 10E3/UL (ref 150–450)
POTASSIUM SERPL-SCNC: 5 MMOL/L (ref 3.4–5.3)
PROT SERPL-MCNC: 7.4 G/DL (ref 6.4–8.3)
RBC # BLD AUTO: 4.62 10E6/UL (ref 3.8–5.2)
RBC #/AREA URNS AUTO: ABNORMAL /HPF
SODIUM SERPL-SCNC: 136 MMOL/L (ref 135–145)
SP GR UR STRIP: >=1.03 (ref 1–1.03)
SQUAMOUS #/AREA URNS AUTO: ABNORMAL /LPF
UROBILINOGEN UR STRIP-ACNC: 0.2 E.U./DL
WBC # BLD AUTO: 5.4 10E3/UL (ref 4–11)
WBC #/AREA URNS AUTO: ABNORMAL /HPF

## 2025-07-31 ASSESSMENT — PAIN SCALES - GENERAL: PAINLEVEL_OUTOF10: MILD PAIN (1)

## 2025-07-31 NOTE — TELEPHONE ENCOUNTER
Endoscopy Scheduling Screen      What insurance is in the chart?  Other:  [employer]    Ordering/Referring Provider: Rosemary Rosa   (If ordering provider performs procedure, schedule with ordering provider unless otherwise instructed. )    BMI: There is no height or weight on file to calculate BMI.  20.27    Sedation Ordered  general anesthesia.   BMI<= 45 45 < BMI <= 48 48 < BMI < = 50  BMI > 50   No Restrictions No MG ASC  No [facility]  [facility] with exceptions Hospital Only OR Only       Do you have a history of malignant hyperthermia?  NO    (Females) Are you currently pregnant?   NO     Are you currently on dialysis?   NO    Do you need assistance transferring?   NO    BMI: There is no height or weight on file to calculate BMI.     Is patients BMI > 50?  NO    BMI > 40?  NO    Do you have a diagnosis of diabetes?  NO    Do you take an Oral or Injectable medication for weight loss or diabetes (excluding insulin)?  NO    Do you take the medication Naltrexone?  NO    Do you take blood thinners?  NO    Prep   Are you currently have chronic kidney disease?  NO    Do you have a diagnosis of cystic fibrosis (CF)?  NO    On a regular basis do you go 3 -5 days between each bowel movements?  NO    Preferred Pharmacy:    [facility] [address] [facility]  [address] [facility] [address]  Phone: [phone] Fax: [phone] Alternate Fax: [phone], [phone]      Final Scheduling Details     Procedure scheduled  Colonoscopy    Surgeon:  Dolores     Date of procedure:  8-26-25     Location  [place] - Patient preference.    What is your communication preference for Instructions and/or Bowel Prep?   MyChart    Patient Reminders:    You will receive a call from a Nurse to review instructions and health history.  This assessment must be completed prior to your procedure.  Failure to complete the Nurse assessment may result in the procedure being cancelled.       On the day of your procedure,  please designate an adult(s) who can drive you home stay with you for the next 24 hours. The medicines used in the exam will make you sleepy. You will not be able to drive.       You cannot take public transportation, ride share services, or non-medical taxi service without a responsible caregiver.  Medical transport services are allowed with the requirement that a responsible caregiver will receive you at your destination.  We require that drivers and caregivers are confirmed prior to your procedure.

## 2025-07-31 NOTE — PROGRESS NOTES
Assessment & Plan     Abdominal pain, generalized  Patient has history of endometriosis status post removal of bilateral ovaries.  She has had off-and-on over the last few years intermittent abdominal cramping diffusely, sometimes focused at lower abdomen occasionally and up to her abdomen.  She endorses feeling bloated, worse over the last few months.  When this happens she switches to clear liquid diet and symptoms improve over a few days.  She does have bowel movement most days sometimes will skip a day.  Bowel movements tend to be slightly on the harder side.  She does drink adequate amount of water.  Will occasionally have blood in her stool, with wiping.  Recommend lab work as below.  With ongoing pain did recommend both upper and lower endoscopy.  I have placed referral to GI as well for second opinion.  I have encouraged her to increase fiber in her diet and water, can do trial fiber supplement for her possible mild constipation.  She is concerned about possible adenomyosis since this was brought up by her other OB/GYN.  Will order pelvic ultrasound to reevaluate, does have known fibroid.  Recommend following up with her ob gyn.  - Comprehensive metabolic panel (BMP + Alb, Alk Phos, ALT, AST, Total. Bili, TP)  - Adult GI  Referral - Consult Only  - CBC with platelets  - Tissue transglutaminase estrada IgA and IgG  - IgA  - Lipase  - Adult GI  Referral - Procedure Only  - UA with Microscopic reflex to Culture - lab collect  - Comprehensive metabolic panel (BMP + Alb, Alk Phos, ALT, AST, Total. Bili, TP)  - CBC with platelets  - Tissue transglutaminase estrada IgA and IgG  - IgA  - Lipase  - UA with Microscopic reflex to Culture - lab collect  - UA Microscopic with Reflex to Culture    Abdominal bloating  - Comprehensive metabolic panel (BMP + Alb, Alk Phos, ALT, AST, Total. Bili, TP)  - Adult GI  Referral - Consult Only  - CBC with platelets  - Tissue transglutaminase estrada IgA and IgG  -  "IgA  - Lipase  - Adult GI  Referral - Procedure Only  - US Pelvic Complete with Transvaginal  - Comprehensive metabolic panel (BMP + Alb, Alk Phos, ALT, AST, Total. Bili, TP)  - CBC with platelets  - Tissue transglutaminase estrada IgA and IgG  - IgA  - Lipase    Blood in stool  - Adult GI  Referral - Consult Only  - Adult GI  Referral - Procedure Only    The longitudinal plan of care for the diagnosis(es)/condition(s) as documented were addressed during this visit. Due to the added complexity in care, I will continue to support Rosita in the subsequent management and with ongoing continuity of care.    Subjective   Rosita is a 45 year old, presenting for the following health issues:  Results        7/31/2025     7:44 AM   Additional Questions   Roomed by Chika LEBLANC MA   Accompanied by Self     History of Present Illness       Reason for visit:  A1cs prediabetic follow up   She is taking medications regularly.          Was seen by an outside OB/GYN provider, and A1c was done.  Was in the prediabetic range.  Did have gestational diabetes when pregnant.  Vitamin D was also low as well.      Stomach pain last week, all over.  Has since mostly subsided. Thinks it may be IBS or her endometriosis. Abdominal bloating-              Review of Systems  Constitutional, HEENT, cardiovascular, pulmonary, gi and gu systems are negative, except as otherwise noted.      Objective    /70 (BP Location: Right arm, Patient Position: Chair, Cuff Size: Adult Small)   Pulse 87   Resp 14   Ht 1.397 m (4' 7\")   Wt 39.6 kg (87 lb 3.2 oz)   LMP 07/13/2025   SpO2 99%   BMI 20.27 kg/m    Body mass index is 20.27 kg/m .  Physical Exam   GENERAL: alert and no distress  EYES: Eyes grossly normal to inspection, PERRL and conjunctivae and sclerae normal  HENT: ear canals and TM's normal, nose and mouth without ulcers or lesions  NECK: no adenopathy, no asymmetry, masses, or scars  RESP: lungs clear to auscultation " - no rales, rhonchi or wheezes  CV: regular rate and rhythm, normal S1 S2, no S3 or S4, no murmur, click or rub, no peripheral edema  ABDOMEN: soft, nontender, no hepatosplenomegaly, no masses and bowel sounds normal  MS: no gross musculoskeletal defects noted, no edema  SKIN: no suspicious lesions or rashes  NEURO: Normal strength and tone, mentation intact and speech normal  PSYCH: mentation appears normal, affect normal/bright          Signed Electronically by: Rosemary Rosa DO

## 2025-08-05 LAB
TTG IGA SER-ACNC: 0.6 U/ML
TTG IGG SER-ACNC: <0.6 U/ML

## 2025-08-11 ENCOUNTER — HOSPITAL ENCOUNTER (OUTPATIENT)
Dept: ULTRASOUND IMAGING | Facility: CLINIC | Age: 45
Discharge: HOME | End: 2025-08-11
Attending: FAMILY MEDICINE | Admitting: FAMILY MEDICINE
Payer: COMMERCIAL

## 2025-08-11 DIAGNOSIS — R14.0 ABDOMINAL BLOATING: ICD-10-CM

## 2025-08-11 PROCEDURE — 76856 US EXAM PELVIC COMPLETE: CPT

## 2025-09-04 ENCOUNTER — PRE VISIT (OUTPATIENT)
Dept: GASTROENTEROLOGY | Facility: CLINIC | Age: 45
End: 2025-09-04

## 2025-09-04 ENCOUNTER — OFFICE VISIT (OUTPATIENT)
Dept: GASTROENTEROLOGY | Facility: CLINIC | Age: 45
End: 2025-09-04
Attending: FAMILY MEDICINE
Payer: COMMERCIAL

## 2025-09-04 VITALS
SYSTOLIC BLOOD PRESSURE: 113 MMHG | HEART RATE: 74 BPM | BODY MASS INDEX: 19.99 KG/M2 | WEIGHT: 86.4 LBS | OXYGEN SATURATION: 100 % | DIASTOLIC BLOOD PRESSURE: 78 MMHG | HEIGHT: 55 IN

## 2025-09-04 DIAGNOSIS — K92.1 BLOOD IN STOOL: ICD-10-CM

## 2025-09-04 DIAGNOSIS — R10.84 ABDOMINAL PAIN, GENERALIZED: ICD-10-CM

## 2025-09-04 DIAGNOSIS — R14.0 ABDOMINAL BLOATING: ICD-10-CM

## 2025-09-04 DIAGNOSIS — K82.8 GALLBLADDER SLUDGE: Primary | ICD-10-CM

## 2025-09-04 ASSESSMENT — PAIN SCALES - GENERAL: PAINLEVEL_OUTOF10: MODERATE PAIN (6)

## (undated) DEVICE — GLOVE PROTEXIS BLUE W/NEU-THERA 7.0  2D73EB70

## (undated) DEVICE — PREP CHLORAPREP 26ML TINTED ORANGE  260815

## (undated) DEVICE — SU MONOCRYL 4-0 PS-2 27" UND Y426H

## (undated) DEVICE — LINEN TOWEL PACK X6 WHITE 5487

## (undated) DEVICE — PROTECTOR ARM ONE-STEP TRENDELENBURG 40418

## (undated) DEVICE — SPECIMEN TRAP MUCOUS 40ML LUKI C30200A

## (undated) DEVICE — SU VICRYL 0 UR-6 27" J603H

## (undated) DEVICE — Device

## (undated) DEVICE — ESU ENDO SCISSORS 5MM CVD 5DCS

## (undated) DEVICE — ESU LIGASURE LAPAROSCOPIC BLUNT TIP SEALER 5MMX37CM LF1837

## (undated) DEVICE — ENDO POUCH UNIV RETRIEVAL SYSTEM INZII 10MM CD001

## (undated) DEVICE — DRSG PRIMAPORE 02X3" 7133

## (undated) DEVICE — SUCTION IRR STRYKERFLOW II W/TIP 250-070-520

## (undated) DEVICE — SOL WATER IRRIG 1000ML BOTTLE 2F7114

## (undated) DEVICE — RETR ELEV / UTERINE MANIPULATOR V-CARE MED CUP 60-6085-201A

## (undated) DEVICE — GLOVE PROTEXIS W/NEU-THERA 6.5  2D73TE65

## (undated) DEVICE — SUCTION MANIFOLD NEPTUNE 2 SYS 4 PORT 0702-020-000

## (undated) DEVICE — KIT PATIENT POSITIONING PIGAZZI LATEX FREE 40580

## (undated) DEVICE — PREP TECHNI-CARE CHLOROXYLENOL 3% 4OZ BOTTLE C222-4ZWO

## (undated) DEVICE — DRAPE MAYO STAND 23X54 8337

## (undated) DEVICE — ENDO ACCESS PLATFORM GELPOINT SGL INCISION CNGL2

## (undated) DEVICE — SOL NACL 0.9% INJ 1000ML BAG 2B1324X

## (undated) DEVICE — JELLY LUBRICATING SURGILUBE 2OZ TUBE

## (undated) DEVICE — WIPES FOLEY CARE SURESTEP PROVON DFC100

## (undated) DEVICE — SOL NACL 0.9% IRRIG 1000ML BOTTLE 2F7124

## (undated) DEVICE — ENDO APPLICATOR SURGIFLO PLASMA COBLATION MS1995

## (undated) DEVICE — ESU GROUND PAD ADULT W/CORD E7507

## (undated) DEVICE — TUBING IRRIG CYSTO/BLADDER SET 81" LF 2C4040

## (undated) DEVICE — RX SURGIFLO HEMOSTATIC MATRIX W/THROMBIN 8ML 2994

## (undated) DEVICE — LINEN TOWEL PACK X30 5481

## (undated) RX ORDER — ACETAMINOPHEN 325 MG/1
TABLET ORAL
Status: DISPENSED
Start: 2021-05-28

## (undated) RX ORDER — HYDROMORPHONE HYDROCHLORIDE 1 MG/ML
INJECTION, SOLUTION INTRAMUSCULAR; INTRAVENOUS; SUBCUTANEOUS
Status: DISPENSED
Start: 2021-05-28

## (undated) RX ORDER — CEFAZOLIN SODIUM 2 G/100ML
INJECTION, SOLUTION INTRAVENOUS
Status: DISPENSED
Start: 2021-05-28

## (undated) RX ORDER — BUPIVACAINE HYDROCHLORIDE 2.5 MG/ML
INJECTION, SOLUTION EPIDURAL; INFILTRATION; INTRACAUDAL
Status: DISPENSED
Start: 2021-05-28

## (undated) RX ORDER — FENTANYL CITRATE 50 UG/ML
INJECTION, SOLUTION INTRAMUSCULAR; INTRAVENOUS
Status: DISPENSED
Start: 2021-05-28

## (undated) RX ORDER — SODIUM CHLORIDE 9 MG/ML
INJECTION, SOLUTION INTRAVENOUS
Status: DISPENSED
Start: 2021-05-28

## (undated) RX ORDER — LIDOCAINE HYDROCHLORIDE 20 MG/ML
INJECTION, SOLUTION EPIDURAL; INFILTRATION; INTRACAUDAL; PERINEURAL
Status: DISPENSED
Start: 2021-05-28

## (undated) RX ORDER — ONDANSETRON 2 MG/ML
INJECTION INTRAMUSCULAR; INTRAVENOUS
Status: DISPENSED
Start: 2021-05-28

## (undated) RX ORDER — PROPOFOL 10 MG/ML
INJECTION, EMULSION INTRAVENOUS
Status: DISPENSED
Start: 2021-05-28

## (undated) RX ORDER — HEPARIN SODIUM 5000 [USP'U]/.5ML
INJECTION, SOLUTION INTRAVENOUS; SUBCUTANEOUS
Status: DISPENSED
Start: 2021-05-28